# Patient Record
Sex: MALE | Race: WHITE | NOT HISPANIC OR LATINO | Employment: OTHER | URBAN - METROPOLITAN AREA
[De-identification: names, ages, dates, MRNs, and addresses within clinical notes are randomized per-mention and may not be internally consistent; named-entity substitution may affect disease eponyms.]

---

## 2023-08-16 ENCOUNTER — HOSPITAL ENCOUNTER (OUTPATIENT)
Dept: DATA CONVERSION | Facility: HOSPITAL | Age: 63
Discharge: HOME | End: 2023-08-16
Payer: COMMERCIAL

## 2023-08-16 DIAGNOSIS — E11.9 TYPE 2 DIABETES MELLITUS WITHOUT COMPLICATIONS (MULTI): ICD-10-CM

## 2023-08-16 DIAGNOSIS — Z00.00 ENCOUNTER FOR GENERAL ADULT MEDICAL EXAMINATION WITHOUT ABNORMAL FINDINGS: ICD-10-CM

## 2023-08-16 DIAGNOSIS — Z12.5 ENCOUNTER FOR SCREENING FOR MALIGNANT NEOPLASM OF PROSTATE: ICD-10-CM

## 2023-08-16 DIAGNOSIS — E78.00 PURE HYPERCHOLESTEROLEMIA, UNSPECIFIED: ICD-10-CM

## 2023-08-16 LAB
ALBUMIN SERPL-MCNC: 4.3 GM/DL (ref 3.5–5)
ALBUMIN/GLOB SERPL: 1.4 RATIO (ref 1.5–3)
ALP BLD-CCNC: 58 U/L (ref 35–125)
ALT SERPL-CCNC: 23 U/L (ref 5–40)
ANION GAP SERPL CALCULATED.3IONS-SCNC: 12 MMOL/L (ref 0–19)
APPEARANCE PLAS: ABNORMAL
AST SERPL-CCNC: 20 U/L (ref 5–40)
BACTERIA UR QL AUTO: NEGATIVE
BASOPHILS # BLD AUTO: 0.06 K/UL (ref 0–0.22)
BASOPHILS NFR BLD AUTO: 1.1 % (ref 0–1)
BILIRUB SERPL-MCNC: 0.6 MG/DL (ref 0.1–1.2)
BILIRUB UR QL STRIP.AUTO: NEGATIVE
BUN SERPL-MCNC: 13 MG/DL (ref 8–25)
BUN/CREAT SERPL: 14.4 RATIO (ref 8–21)
CALCIUM SERPL-MCNC: 9.1 MG/DL (ref 8.5–10.4)
CHLORIDE SERPL-SCNC: 105 MMOL/L (ref 97–107)
CHOLEST SERPL-MCNC: 129 MG/DL (ref 133–200)
CHOLEST/HDLC SERPL: 2.8 RATIO
CLARITY UR: CLEAR
CO2 SERPL-SCNC: 25 MMOL/L (ref 24–31)
COLOR SPUN FLD: ABNORMAL
COLOR UR: ABNORMAL
CREAT SERPL-MCNC: 0.9 MG/DL (ref 0.4–1.6)
CREAT UR-MCNC: 133.7 MG/DL
DEPRECATED RDW RBC AUTO: 46.4 FL (ref 37–54)
DIFFERENTIAL METHOD BLD: ABNORMAL
EOSINOPHIL # BLD AUTO: 0.19 K/UL (ref 0–0.45)
EOSINOPHIL NFR BLD: 3.4 % (ref 0–3)
ERYTHROCYTE [DISTWIDTH] IN BLOOD BY AUTOMATED COUNT: 12.9 % (ref 11.7–15)
FASTING STATUS PATIENT QL REPORTED: ABNORMAL
GFR SERPL CREATININE-BSD FRML MDRD: 95 ML/MIN/1.73 M2
GLOBULIN SER-MCNC: 3.1 G/DL (ref 1.9–3.7)
GLUCOSE SERPL-MCNC: 114 MG/DL (ref 65–99)
GLUCOSE UR STRIP.AUTO-MCNC: 100 MG/DL
HBA1C MFR BLD: 8.2 % (ref 4–6)
HCT VFR BLD AUTO: 39.5 % (ref 41–50)
HDLC SERPL-MCNC: 46 MG/DL
HGB BLD-MCNC: 13.2 GM/DL (ref 13.5–16.5)
HGB UR QL STRIP.AUTO: ABNORMAL /HPF (ref 0–3)
HGB UR QL: NEGATIVE
HYALINE CASTS UR QL AUTO: ABNORMAL /LPF
IMM GRANULOCYTES # BLD AUTO: 0.04 K/UL (ref 0–0.1)
KETONES UR QL STRIP.AUTO: NEGATIVE
LDLC SERPL CALC-MCNC: 62 MG/DL (ref 65–130)
LEUKOCYTE ESTERASE UR QL STRIP.AUTO: NEGATIVE
LYMPHOCYTES # BLD AUTO: 1.65 K/UL (ref 1.2–3.2)
LYMPHOCYTES NFR BLD MANUAL: 29.7 % (ref 20–40)
MCH RBC QN AUTO: 32.7 PG (ref 26–34)
MCHC RBC AUTO-ENTMCNC: 33.4 % (ref 31–37)
MCV RBC AUTO: 97.8 FL (ref 80–100)
MICROALBUMIN UR-MCNC: 12 MG/L (ref 0–23)
MICROALBUMIN/CREAT UR: 9 MG/G (ref 0–30)
MONOCYTES # BLD AUTO: 0.45 K/UL (ref 0–0.8)
MONOCYTES NFR BLD MANUAL: 8.1 % (ref 0–8)
NEUTROPHILS # BLD AUTO: 3.16 K/UL
NEUTROPHILS # BLD AUTO: 3.16 K/UL (ref 1.8–7.7)
NEUTROPHILS.IMMATURE NFR BLD: 0.7 % (ref 0–1)
NEUTS SEG NFR BLD: 57 % (ref 50–70)
NITRITE UR QL STRIP.AUTO: NEGATIVE
NRBC BLD-RTO: 0 /100 WBC
PH UR STRIP.AUTO: 5.5 [PH] (ref 4.6–8)
PLATELET # BLD AUTO: 213 K/UL (ref 150–450)
PMV BLD AUTO: 9.9 CU (ref 7–12.6)
POTASSIUM SERPL-SCNC: 4 MMOL/L (ref 3.4–5.1)
PROT SERPL-MCNC: 7.4 G/DL (ref 5.9–7.9)
PROT UR STRIP.AUTO-MCNC: ABNORMAL MG/DL
PSA SERPL-MCNC: 0.6 NG/ML (ref 0–4.1)
RBC # BLD AUTO: 4.04 M/UL (ref 4.5–5.5)
REFLEX MICROSCOPIC (UA): ABNORMAL
SODIUM SERPL-SCNC: 142 MMOL/L (ref 133–145)
SP GR UR STRIP.AUTO: 1.02 (ref 1–1.03)
SQUAMOUS UR QL AUTO: ABNORMAL /HPF
TRIGL SERPL-MCNC: 103 MG/DL (ref 40–150)
UROBILINOGEN UR QL STRIP.AUTO: NORMAL MG/DL (ref 0–1)
WBC # BLD AUTO: 5.6 K/UL (ref 4.5–11)
WBC #/AREA URNS AUTO: 1 /HPF (ref 0–3)

## 2023-10-23 DIAGNOSIS — E78.5 HYPERLIPIDEMIA, UNSPECIFIED HYPERLIPIDEMIA TYPE: Primary | ICD-10-CM

## 2023-11-01 RX ORDER — EZETIMIBE 10 MG/1
10 TABLET ORAL NIGHTLY
Qty: 90 TABLET | Refills: 3 | Status: SHIPPED | OUTPATIENT
Start: 2023-11-01 | End: 2023-11-06 | Stop reason: SDUPTHER

## 2023-11-04 DIAGNOSIS — E11.9 TYPE 2 DIABETES MELLITUS WITHOUT COMPLICATION, WITHOUT LONG-TERM CURRENT USE OF INSULIN (MULTI): Primary | ICD-10-CM

## 2023-11-06 DIAGNOSIS — E78.5 HYPERLIPIDEMIA, UNSPECIFIED HYPERLIPIDEMIA TYPE: Primary | ICD-10-CM

## 2023-11-06 RX ORDER — GLIPIZIDE 10 MG/1
10 TABLET ORAL DAILY
Qty: 90 TABLET | Refills: 0 | Status: SHIPPED | OUTPATIENT
Start: 2023-11-06 | End: 2023-11-30

## 2023-11-06 RX ORDER — EZETIMIBE 10 MG/1
10 TABLET ORAL NIGHTLY
Qty: 90 TABLET | Refills: 3 | Status: SHIPPED | OUTPATIENT
Start: 2023-11-06 | End: 2023-11-08 | Stop reason: SDUPTHER

## 2023-11-08 DIAGNOSIS — E78.5 HYPERLIPIDEMIA, UNSPECIFIED HYPERLIPIDEMIA TYPE: Primary | ICD-10-CM

## 2023-11-08 RX ORDER — EZETIMIBE 10 MG/1
10 TABLET ORAL NIGHTLY
Qty: 90 TABLET | Refills: 3 | Status: SHIPPED | OUTPATIENT
Start: 2023-11-08 | End: 2023-12-19 | Stop reason: SDUPTHER

## 2023-11-17 ENCOUNTER — TELEPHONE (OUTPATIENT)
Dept: PRIMARY CARE | Facility: CLINIC | Age: 63
End: 2023-11-17

## 2023-11-17 ENCOUNTER — LAB (OUTPATIENT)
Dept: LAB | Facility: LAB | Age: 63
End: 2023-11-17
Payer: MEDICARE

## 2023-11-17 DIAGNOSIS — E11.9 TYPE 2 DIABETES MELLITUS WITHOUT COMPLICATION, UNSPECIFIED WHETHER LONG TERM INSULIN USE (MULTI): ICD-10-CM

## 2023-11-17 LAB
ALBUMIN SERPL-MCNC: 4.3 G/DL (ref 3.5–5)
ALP BLD-CCNC: 50 U/L (ref 35–125)
ALT SERPL-CCNC: 17 U/L (ref 5–40)
ANION GAP SERPL CALC-SCNC: 10 MMOL/L
AST SERPL-CCNC: 15 U/L (ref 5–40)
BILIRUB SERPL-MCNC: 0.7 MG/DL (ref 0.1–1.2)
BUN SERPL-MCNC: 18 MG/DL (ref 8–25)
CALCIUM SERPL-MCNC: 9 MG/DL (ref 8.5–10.4)
CHLORIDE SERPL-SCNC: 103 MMOL/L (ref 97–107)
CO2 SERPL-SCNC: 26 MMOL/L (ref 24–31)
CREAT SERPL-MCNC: 0.9 MG/DL (ref 0.4–1.6)
EST. AVERAGE GLUCOSE BLD GHB EST-MCNC: 186 MG/DL
GFR SERPL CREATININE-BSD FRML MDRD: >90 ML/MIN/1.73M*2
GLUCOSE SERPL-MCNC: 172 MG/DL (ref 65–99)
HBA1C MFR BLD: 8.1 %
POTASSIUM SERPL-SCNC: 4.5 MMOL/L (ref 3.4–5.1)
PROT SERPL-MCNC: 7 G/DL (ref 5.9–7.9)
SODIUM SERPL-SCNC: 139 MMOL/L (ref 133–145)

## 2023-11-17 PROCEDURE — 36415 COLL VENOUS BLD VENIPUNCTURE: CPT

## 2023-11-17 PROCEDURE — 83036 HEMOGLOBIN GLYCOSYLATED A1C: CPT

## 2023-11-17 PROCEDURE — 80053 COMPREHEN METABOLIC PANEL: CPT

## 2023-11-21 ENCOUNTER — OFFICE VISIT (OUTPATIENT)
Dept: PRIMARY CARE | Facility: CLINIC | Age: 63
End: 2023-11-21
Payer: MEDICARE

## 2023-11-21 ENCOUNTER — TELEPHONE (OUTPATIENT)
Dept: PRIMARY CARE | Facility: CLINIC | Age: 63
End: 2023-11-21

## 2023-11-21 VITALS
WEIGHT: 203 LBS | DIASTOLIC BLOOD PRESSURE: 70 MMHG | HEART RATE: 83 BPM | SYSTOLIC BLOOD PRESSURE: 122 MMHG | OXYGEN SATURATION: 96 % | BODY MASS INDEX: 30.87 KG/M2

## 2023-11-21 DIAGNOSIS — E11.9 TYPE 2 DIABETES MELLITUS WITHOUT COMPLICATION, UNSPECIFIED WHETHER LONG TERM INSULIN USE (MULTI): ICD-10-CM

## 2023-11-21 DIAGNOSIS — E78.5 HYPERLIPIDEMIA, UNSPECIFIED HYPERLIPIDEMIA TYPE: ICD-10-CM

## 2023-11-21 DIAGNOSIS — E11.9 TYPE 2 DIABETES MELLITUS WITHOUT COMPLICATION, WITHOUT LONG-TERM CURRENT USE OF INSULIN (MULTI): Primary | ICD-10-CM

## 2023-11-21 PROBLEM — M75.81 TENDINITIS OF RIGHT ROTATOR CUFF: Status: ACTIVE | Noted: 2023-11-21

## 2023-11-21 PROBLEM — L08.89 OTHER SPECIFIED LOCAL INFECTIONS OF THE SKIN AND SUBCUTANEOUS TISSUE: Status: ACTIVE | Noted: 2021-02-17

## 2023-11-21 PROBLEM — M54.12 CERVICAL RADICULOPATHY: Status: ACTIVE | Noted: 2023-11-21

## 2023-11-21 PROBLEM — M54.50 ACUTE LOW BACK PAIN: Status: ACTIVE | Noted: 2023-11-21

## 2023-11-21 PROBLEM — R07.9 CHEST PAIN: Status: ACTIVE | Noted: 2019-02-19

## 2023-11-21 PROBLEM — M54.9 BACKACHE: Status: ACTIVE | Noted: 2023-11-21

## 2023-11-21 PROBLEM — I20.9 ANGINA PECTORIS (CMS-HCC): Status: ACTIVE | Noted: 2023-11-21

## 2023-11-21 PROBLEM — R09.89 CAROTID BRUIT: Status: ACTIVE | Noted: 2023-11-21

## 2023-11-21 PROBLEM — E78.00 HYPERCHOLESTEROLEMIA: Status: ACTIVE | Noted: 2023-11-21

## 2023-11-21 PROBLEM — G95.9 CERVICAL MYELOPATHY WITH CERVICAL RADICULOPATHY (MULTI): Status: ACTIVE | Noted: 2023-11-21

## 2023-11-21 PROBLEM — R09.81 NASAL CONGESTION: Status: ACTIVE | Noted: 2021-12-03

## 2023-11-21 PROBLEM — R07.81 RIB PAIN: Status: ACTIVE | Noted: 2023-11-21

## 2023-11-21 PROBLEM — E66.3 OVERWEIGHT: Status: ACTIVE | Noted: 2019-06-13

## 2023-11-21 PROBLEM — M79.606 PAIN OF LOWER EXTREMITY: Status: ACTIVE | Noted: 2023-11-21

## 2023-11-21 PROBLEM — R10.9 RIGHT FLANK PAIN: Status: ACTIVE | Noted: 2022-12-07

## 2023-11-21 PROBLEM — M25.561 ACUTE PAIN OF RIGHT KNEE: Status: ACTIVE | Noted: 2022-02-04

## 2023-11-21 PROBLEM — K40.90 RIGHT INGUINAL HERNIA: Status: ACTIVE | Noted: 2023-11-21

## 2023-11-21 PROBLEM — Z96.652 S/P TOTAL KNEE ARTHROPLASTY, LEFT: Status: ACTIVE | Noted: 2021-08-24

## 2023-11-21 PROBLEM — M25.461 EFFUSION OF RIGHT KNEE: Status: ACTIVE | Noted: 2023-11-21

## 2023-11-21 PROBLEM — M54.16 CHRONIC LUMBAR RADICULOPATHY: Status: ACTIVE | Noted: 2023-11-21

## 2023-11-21 PROBLEM — R06.09 DYSPNEA ON EXERTION: Status: ACTIVE | Noted: 2023-11-21

## 2023-11-21 PROBLEM — J11.1 INFLUENZA: Status: ACTIVE | Noted: 2023-11-21

## 2023-11-21 PROBLEM — R19.8 RLQ FULLNESS: Status: ACTIVE | Noted: 2022-12-07

## 2023-11-21 PROBLEM — H61.23 IMPACTED CERUMEN, BILATERAL: Status: ACTIVE | Noted: 2021-12-03

## 2023-11-21 PROBLEM — R26.9 GAIT DISTURBANCE: Status: ACTIVE | Noted: 2023-11-21

## 2023-11-21 PROBLEM — L98.9 SKIN LESIONS: Status: ACTIVE | Noted: 2022-03-04

## 2023-11-21 PROBLEM — E78.00 HYPERCHOLESTEREMIA: Status: ACTIVE | Noted: 2023-11-21

## 2023-11-21 PROBLEM — R05.9 COUGH: Status: ACTIVE | Noted: 2019-05-14

## 2023-11-21 PROBLEM — M62.81 MUSCLE WEAKNESS (GENERALIZED): Status: ACTIVE | Noted: 2023-11-21

## 2023-11-21 PROBLEM — M25.519 SHOULDER PAIN: Status: ACTIVE | Noted: 2023-11-21

## 2023-11-21 PROBLEM — M48.02 CERVICAL SPINAL STENOSIS: Status: ACTIVE | Noted: 2023-11-21

## 2023-11-21 PROBLEM — R10.13 EPIGASTRIC PAIN: Status: ACTIVE | Noted: 2019-06-13

## 2023-11-21 PROBLEM — L01.00 IMPETIGO: Status: ACTIVE | Noted: 2018-10-15

## 2023-11-21 PROBLEM — M54.50 LOW BACK PAIN: Status: ACTIVE | Noted: 2023-11-21

## 2023-11-21 PROBLEM — F41.9 ANXIETY: Status: ACTIVE | Noted: 2023-11-21

## 2023-11-21 PROBLEM — M48.02 FORAMINAL STENOSIS OF CERVICAL REGION: Status: ACTIVE | Noted: 2023-11-21

## 2023-11-21 PROBLEM — M54.12 CERVICAL MYELOPATHY WITH CERVICAL RADICULOPATHY (MULTI): Status: ACTIVE | Noted: 2023-11-21

## 2023-11-21 PROBLEM — M54.2 NECK PAIN: Status: ACTIVE | Noted: 2022-03-29

## 2023-11-21 PROBLEM — R19.4 CHANGE IN BOWEL HABITS: Status: ACTIVE | Noted: 2022-12-21

## 2023-11-21 PROBLEM — R10.31 CONTINUOUS RLQ ABDOMINAL PAIN: Status: ACTIVE | Noted: 2022-12-21

## 2023-11-21 PROBLEM — R07.89 CHEST DISCOMFORT: Status: ACTIVE | Noted: 2023-11-21

## 2023-11-21 PROBLEM — M17.11 PRIMARY OSTEOARTHRITIS OF RIGHT KNEE: Status: ACTIVE | Noted: 2021-08-24

## 2023-11-21 PROBLEM — I25.10 CORONARY ARTERY DISEASE: Status: ACTIVE | Noted: 2020-06-16

## 2023-11-21 PROBLEM — M51.06 INTERVERTEBRAL DISC DISORDER WITH MYELOPATHY, LUMBAR REGION: Status: ACTIVE | Noted: 2018-10-01

## 2023-11-21 PROBLEM — R06.02 SOB (SHORTNESS OF BREATH): Status: ACTIVE | Noted: 2019-05-19

## 2023-11-21 PROBLEM — R11.2 NAUSEA & VOMITING: Status: ACTIVE | Noted: 2023-11-21

## 2023-11-21 PROBLEM — R97.20 HIGH PROSTATE SPECIFIC ANTIGEN (PSA): Status: ACTIVE | Noted: 2022-08-12

## 2023-11-21 PROCEDURE — 3078F DIAST BP <80 MM HG: CPT | Performed by: STUDENT IN AN ORGANIZED HEALTH CARE EDUCATION/TRAINING PROGRAM

## 2023-11-21 PROCEDURE — 99214 OFFICE O/P EST MOD 30 MIN: CPT | Performed by: STUDENT IN AN ORGANIZED HEALTH CARE EDUCATION/TRAINING PROGRAM

## 2023-11-21 PROCEDURE — 1036F TOBACCO NON-USER: CPT | Performed by: STUDENT IN AN ORGANIZED HEALTH CARE EDUCATION/TRAINING PROGRAM

## 2023-11-21 PROCEDURE — 3074F SYST BP LT 130 MM HG: CPT | Performed by: STUDENT IN AN ORGANIZED HEALTH CARE EDUCATION/TRAINING PROGRAM

## 2023-11-21 PROCEDURE — 3052F HG A1C>EQUAL 8.0%<EQUAL 9.0%: CPT | Performed by: STUDENT IN AN ORGANIZED HEALTH CARE EDUCATION/TRAINING PROGRAM

## 2023-11-21 RX ORDER — PIOGLITAZONEHYDROCHLORIDE 30 MG/1
30 TABLET ORAL DAILY
COMMUNITY
Start: 2023-08-21 | End: 2024-02-16 | Stop reason: SINTOL

## 2023-11-21 RX ORDER — GABAPENTIN 100 MG/1
100 CAPSULE ORAL
COMMUNITY

## 2023-11-21 RX ORDER — METHOCARBAMOL 500 MG/1
500 TABLET, FILM COATED ORAL 3 TIMES DAILY
COMMUNITY
End: 2024-01-17

## 2023-11-21 RX ORDER — CYCLOBENZAPRINE HCL 5 MG
TABLET ORAL
COMMUNITY
Start: 2018-05-22 | End: 2023-12-04 | Stop reason: ALTCHOICE

## 2023-11-21 RX ORDER — GLIMEPIRIDE 2 MG/1
2 TABLET ORAL
COMMUNITY
Start: 2022-08-12 | End: 2023-12-04 | Stop reason: ALTCHOICE

## 2023-11-21 RX ORDER — ISOSORBIDE DINITRATE 30 MG/1
TABLET ORAL EVERY 24 HOURS
COMMUNITY
Start: 2012-01-12 | End: 2023-12-04 | Stop reason: ALTCHOICE

## 2023-11-21 RX ORDER — PRAVASTATIN SODIUM 40 MG/1
40 TABLET ORAL
COMMUNITY
End: 2023-12-04 | Stop reason: ALTCHOICE

## 2023-11-21 RX ORDER — ROSUVASTATIN CALCIUM 40 MG/1
40 TABLET, COATED ORAL DAILY
COMMUNITY
Start: 2023-03-01 | End: 2023-12-04 | Stop reason: ALTCHOICE

## 2023-11-21 RX ORDER — SERTRALINE HYDROCHLORIDE 50 MG/1
1 TABLET, FILM COATED ORAL NIGHTLY
COMMUNITY
Start: 2018-01-25 | End: 2023-11-27 | Stop reason: WASHOUT

## 2023-11-21 RX ORDER — NITROGLYCERIN 0.4 MG/1
0.4 TABLET SUBLINGUAL EVERY 5 MIN PRN
COMMUNITY
Start: 2023-10-30

## 2023-11-21 RX ORDER — BACLOFEN 10 MG/1
TABLET ORAL
COMMUNITY

## 2023-11-21 RX ORDER — ACETAMINOPHEN 500 MG
1000 TABLET ORAL EVERY 8 HOURS PRN
COMMUNITY
Start: 2022-01-31 | End: 2023-12-04 | Stop reason: SDDI

## 2023-11-21 RX ORDER — ONDANSETRON 4 MG/1
TABLET, ORALLY DISINTEGRATING ORAL
COMMUNITY
Start: 2018-02-22 | End: 2023-11-27 | Stop reason: WASHOUT

## 2023-11-21 RX ORDER — METOPROLOL SUCCINATE 25 MG/1
TABLET, EXTENDED RELEASE ORAL
COMMUNITY
Start: 2023-11-11 | End: 2023-12-19 | Stop reason: SDUPTHER

## 2023-11-21 RX ORDER — CEFUROXIME AXETIL 250 MG/1
TABLET ORAL
COMMUNITY
Start: 2018-02-27 | End: 2023-12-04 | Stop reason: ALTCHOICE

## 2023-11-21 RX ORDER — ASPIRIN 81 MG/1
81 TABLET ORAL 2 TIMES DAILY
COMMUNITY
Start: 2022-02-01

## 2023-11-21 RX ORDER — GABAPENTIN 400 MG/1
400 CAPSULE ORAL NIGHTLY
COMMUNITY

## 2023-11-21 RX ORDER — METFORMIN HYDROCHLORIDE 500 MG/1
TABLET ORAL EVERY 24 HOURS
COMMUNITY
End: 2023-12-04 | Stop reason: ALTCHOICE

## 2023-11-21 ASSESSMENT — PATIENT HEALTH QUESTIONNAIRE - PHQ9
SUM OF ALL RESPONSES TO PHQ9 QUESTIONS 1 AND 2: 0
1. LITTLE INTEREST OR PLEASURE IN DOING THINGS: NOT AT ALL
2. FEELING DOWN, DEPRESSED OR HOPELESS: NOT AT ALL

## 2023-11-21 ASSESSMENT — PAIN SCALES - GENERAL: PAINLEVEL: 0-NO PAIN

## 2023-11-21 NOTE — PROGRESS NOTES
"Subjective   Martir Johnson is a 63 y.o. male who presents for Diabetes Follow up    #DM  A1c - 8.1, 8.2, 7.5, 7.5, 7.1, 7.8, 7.1,  7.4, 7.3, 7.5, 7.4, 7.0, 7.3  Current treatment:  - Metformin 1000 mg BID ER--- prescribed to be taking 2 500mg tablets twice a day but says he is taking 1 tablet  twice daily, says he thinks that is what the bottle says.  - Pioglitazone 15mg BID --- we discussed changing this to 30mg once daily - he is not sure if taking this way  - Glipizide 10mg  Not checking home sugars  No symptoms of hypoglycemia  Refuses CGM   Last eGFR - >90  Last Creatinine - 0.9  Last microalbumin - 6/22  Last foot exam - sees podiatry, Dr. Schwarz  Pneumovax done? - 2013  Need for diabetic educator? - No  Last eye exam - 10/2023 Dr Fatima    Follows with cardiology for CAD, Dr. Dunlap    LAB REVIEW   Glucose   Date Value   11/17/2023 172 mg/dL (H)   08/16/2023 114 MG/DL (H)   04/18/2023 165 MG/DL (H)   12/19/2022 148 MG/DL (H)     Hemoglobin A1C (%)   Date Value   11/17/2023 8.1 (H)   08/16/2023 8.2 (H)   04/18/2023 7.5 (H)   12/19/2022 7.5 (H)   01/26/2022 7.2 (H)     Creatinine   Date Value   11/17/2023 0.90 mg/dL   08/16/2023 0.9 MG/DL   04/18/2023 0.9 MG/DL   12/19/2022 0.9 MG/DL     Lab Results   Component Value Date    ALBUR 12 08/16/2023    LDLCALC 62 (L) 08/16/2023    CREATININE 0.90 11/17/2023     Lab Results   Component Value Date    CHOL 129 (L) 08/16/2023    CHOL 118 (L) 04/18/2023    CHOL 221 (H) 03/16/2023     Lab Results   Component Value Date    HDL 46 08/16/2023    HDL 47 04/18/2023    HDL 41 03/16/2023     Lab Results   Component Value Date    LDLCALC 62 (L) 08/16/2023    LDLCALC 54 (L) 04/18/2023    LDLCALC 147 (H) 03/16/2023     Lab Results   Component Value Date    TRIG 103 08/16/2023    TRIG 84 04/18/2023    TRIG 165 (H) 03/16/2023     No components found for: \"CHOLHDL\"  Lab Results   Component Value Date    LDLCALC 62 (L) 08/16/2023       ROS otherwise negative aside from what was " mentioned above in HPI.      Objective   /70   Pulse 83   Wt 92.1 kg (203 lb)   SpO2 96%   BMI 30.87 kg/m²     Physical Exam  Constitutional:       Appearance: Normal appearance.   Cardiovascular:      Rate and Rhythm: Normal rate and regular rhythm.      Heart sounds: Normal heart sounds. No murmur heard.  Pulmonary:      Effort: Pulmonary effort is normal.      Breath sounds: Normal breath sounds. No wheezing, rhonchi or rales.   Musculoskeletal:      Right lower leg: No edema.      Left lower leg: No edema.   Neurological:      Mental Status: He is alert.         Assessment/Plan   1. Type 2 diabetes mellitus without complication, without long-term current use of insulin (CMS/[facility])  Needs better control. He is not clear on how he is currently taking his medications at home so difficult to make medication changes at this time. Will have him schedule with pharmacist for medication reconciliation and DM medication management. He does not check blood sugars at home and is not willing to start. Discussed CGM with him and do recommend he consider so that we can get his diabetes under better control. He said he is willing to discuss further with pharmacist and is potentially open to trying this. Will follow with pharmacist in next few weeks and schedule with me in 3 months.

## 2023-11-24 RX ORDER — ATORVASTATIN CALCIUM 20 MG/1
20 TABLET, FILM COATED ORAL DAILY
Qty: 90 TABLET | Refills: 3 | Status: SHIPPED | OUTPATIENT
Start: 2023-11-24 | End: 2023-12-19 | Stop reason: SDUPTHER

## 2023-11-29 DIAGNOSIS — E11.9 TYPE 2 DIABETES MELLITUS WITHOUT COMPLICATION, WITHOUT LONG-TERM CURRENT USE OF INSULIN (MULTI): ICD-10-CM

## 2023-11-30 RX ORDER — GLIPIZIDE 10 MG/1
10 TABLET ORAL DAILY
Qty: 90 TABLET | Refills: 0 | Status: SHIPPED | OUTPATIENT
Start: 2023-11-30 | End: 2024-06-10

## 2023-12-04 ENCOUNTER — CLINICAL SUPPORT (OUTPATIENT)
Dept: PRIMARY CARE | Facility: CLINIC | Age: 63
End: 2023-12-04

## 2023-12-04 DIAGNOSIS — E11.65 TYPE 2 DIABETES MELLITUS WITH HYPERGLYCEMIA, WITHOUT LONG-TERM CURRENT USE OF INSULIN (MULTI): Primary | ICD-10-CM

## 2023-12-04 RX ORDER — FLASH GLUCOSE SENSOR
KIT MISCELLANEOUS
Qty: 2 EACH | Refills: 5 | Status: SHIPPED | OUTPATIENT
Start: 2023-12-04

## 2023-12-04 RX ORDER — FLASH GLUCOSE SCANNING READER
EACH MISCELLANEOUS
Qty: 1 EACH | Refills: 0 | Status: SHIPPED | OUTPATIENT
Start: 2023-12-04

## 2023-12-04 RX ORDER — DAPAGLIFLOZIN 10 MG/1
10 TABLET, FILM COATED ORAL DAILY
Qty: 30 TABLET | Refills: 5 | Status: SHIPPED | OUTPATIENT
Start: 2023-12-04 | End: 2024-02-09 | Stop reason: SDUPTHER

## 2023-12-04 NOTE — PATIENT INSTRUCTIONS
STOP Metformin   START Farxiga 10mg once daily in morning  Call for appt with clinical pharmacist once Heath supplies are received from pharmacy

## 2023-12-04 NOTE — PROGRESS NOTES
"Medication Review    Mr. Johnson presents to the office today for medication reconciliation and management of diabetes.  Pt brings all medications with him to appt for review.      Medication list reviewed and updated.    Pt reports frequent diarrhea/stomach pain  Pt reports recent polydipsia, polyuria  Previously used insulin for diabetes control but states that his skin is \"too thick\" and he cannot give himself injections.      Referring Provider: Zara Castellanos MD    Hemoglobin A1C (%)   Date Value   11/17/2023 8.1 (H)   08/16/2023 8.2 (H)   04/18/2023 7.5 (H)   12/19/2022 7.5 (H)   01/26/2022 7.2 (H)     Glucose   Date Value   11/17/2023 172 mg/dL (H)   08/16/2023 114 MG/DL (H)   04/18/2023 165 MG/DL (H)   12/19/2022 148 MG/DL (H)     Creatinine   Date Value   11/17/2023 0.90 mg/dL   08/16/2023 0.9 MG/DL   04/18/2023 0.9 MG/DL   12/19/2022 0.9 MG/DL     eGFR (mL/min/1.73m*2)   Date Value   11/17/2023 >90     ESTIMATED GFR (mL/min/1.73 m2)   Date Value   08/16/2023 95   04/18/2023 95   12/19/2022 96       CURRENT PHARMACOTHERAPY  Medication Reconciliation completed with patient in office today   Current Outpatient Medications on File Prior to Visit   Medication Sig Dispense Refill    acetaminophen (Tylenol) 500 mg tablet Take 2 tablets (1,000 mg) by mouth every 8 hours if needed.      aspirin 81 mg EC tablet Take 1 tablet (81 mg) by mouth twice a day.      atorvastatin (Lipitor) 20 mg tablet Take 1 tablet (20 mg) by mouth once daily. 90 tablet 3    baclofen (Lioresal) 10 mg tablet TAKE ONE-HALF TO 1 TABLET BY MOUTH EVERY 8 HOURS AS NEEDED AS INSTRUCTED      cefuroxime (Ceftin) 250 mg tablet Take by mouth.      cyclobenzaprine (Flexeril) 5 mg tablet Take by mouth.      ezetimibe (Zetia) 10 mg tablet Take 1 tablet (10 mg) by mouth once daily at bedtime. 90 tablet 3    gabapentin (Neurontin) 100 mg capsule Take 1 capsule (100 mg) by mouth once daily in the morning. Take before meals.      gabapentin (Neurontin) 400 mg " capsule Take 1 capsule (400 mg) by mouth once daily at bedtime.      glimepiride (Amaryl) 2 mg tablet Take 1 tablet (2 mg) by mouth.      glipiZIDE (Glucotrol) 10 mg tablet TAKE ONE TABLET BY MOUTH EVERY DAY 90 tablet 0    isosorbide dinitrate (Isordil) 30 mg tablet once every 24 hours.      metFORMIN (Glucophage) 500 mg tablet once every 24 hours.      methocarbamol (Robaxin) 500 mg tablet Take 1 tablet (500 mg) by mouth 3 times a day.      metoprolol succinate XL (Toprol-XL) 25 mg 24 hr tablet TAKE ONE TABLET IN THE EVENING (IN ADDITION TO THE 50 MG IN THE MORNING).      nitroglycerin (Nitrostat) 0.4 mg SL tablet Place 1 tablet (0.4 mg) under the tongue every 5 minutes if needed for chest pain. place 1 tablet under tongue every 5 minutes for up to 3 doses as needed for chest pain. Call 911 if pain persists      pioglitazone (Actos) 30 mg tablet Take 1 tablet (30 mg) by mouth once daily.      pravastatin (Pravachol) 40 mg tablet Take 1 tablet (40 mg) by mouth once daily.      rosuvastatin (Crestor) 40 mg tablet Take 1 tablet (40 mg) by mouth once daily.      [DISCONTINUED] glipiZIDE (Glucotrol) 10 mg tablet TAKE ONE TABLET BY MOUTH ONCE A DAY 90 tablet 0     No current facility-administered medications on file prior to visit.       Allergies   Allergen Reactions    Codeine Hives    Iodine Rash    Hydrocodone-Homatropine Hives    Nsaids (Non-Steroidal Anti-Inflammatory Drug) Unknown     gi upset         SMBG:  Testing at home? No  Recent FBS unknown     CGM:  Pt does not have smartphone, will order [employer] 2 reader/sensors.  Pt will make appt for CGM start once supplies are received    Hypoglycemia tx/sx/prevention:   denies issues with low glucose  Reviewed sx and treatment  Advised to carry glucose source at all times    Assessment/Plan:  Suggest discontinuing Metformin due to SE/diarrhea and stomach pain   Will benefit from addition of Farxiga.    Farxiga Education:  - Counseled patient on Farxiga MOA,  expectations, side effects, duration of therapy, administration, and monitoring parameters.  - Reviewed the benefits of SGLT-2i therapy, such as glycemic control and kidney and CV protection.  - Advised patient to practice proper  hygiene to reduce risk of UTIs or yeast infections.  - Advised patient to maintain adequate fluid intake to remain hydrated while on SGLT2i therapy.  - Answered all patient questions and concerns.      Plan:  STOP Metformin   START Farxiga 10mg once daily in morning  Call for appt with clinical pharmacist once Heath supplies are received from pharmacy       Treatment and plan discussed with  JOEL Castellanos MD, COLETTE Kevin, [facility]

## 2023-12-19 ENCOUNTER — OFFICE VISIT (OUTPATIENT)
Dept: CARDIOLOGY | Facility: HOSPITAL | Age: 63
End: 2023-12-19
Payer: MEDICARE

## 2023-12-19 VITALS
HEART RATE: 72 BPM | DIASTOLIC BLOOD PRESSURE: 70 MMHG | WEIGHT: 200 LBS | HEIGHT: 68 IN | SYSTOLIC BLOOD PRESSURE: 115 MMHG | BODY MASS INDEX: 30.31 KG/M2

## 2023-12-19 DIAGNOSIS — E78.5 HYPERLIPIDEMIA, UNSPECIFIED HYPERLIPIDEMIA TYPE: ICD-10-CM

## 2023-12-19 DIAGNOSIS — I25.10 CORONARY ARTERY DISEASE, UNSPECIFIED VESSEL OR LESION TYPE, UNSPECIFIED WHETHER ANGINA PRESENT, UNSPECIFIED WHETHER NATIVE OR TRANSPLANTED HEART: Primary | ICD-10-CM

## 2023-12-19 PROCEDURE — 99214 OFFICE O/P EST MOD 30 MIN: CPT | Performed by: INTERNAL MEDICINE

## 2023-12-19 PROCEDURE — 1036F TOBACCO NON-USER: CPT | Performed by: INTERNAL MEDICINE

## 2023-12-19 PROCEDURE — 3074F SYST BP LT 130 MM HG: CPT | Performed by: INTERNAL MEDICINE

## 2023-12-19 PROCEDURE — 3078F DIAST BP <80 MM HG: CPT | Performed by: INTERNAL MEDICINE

## 2023-12-19 PROCEDURE — 3052F HG A1C>EQUAL 8.0%<EQUAL 9.0%: CPT | Performed by: INTERNAL MEDICINE

## 2023-12-19 RX ORDER — METOPROLOL SUCCINATE 25 MG/1
25 TABLET, EXTENDED RELEASE ORAL 2 TIMES DAILY
Qty: 180 TABLET | Refills: 3 | Status: SHIPPED | OUTPATIENT
Start: 2023-12-19 | End: 2024-12-18

## 2023-12-19 RX ORDER — ATORVASTATIN CALCIUM 20 MG/1
20 TABLET, FILM COATED ORAL DAILY
Qty: 90 TABLET | Refills: 3 | Status: SHIPPED | OUTPATIENT
Start: 2023-12-19 | End: 2024-12-18

## 2023-12-19 RX ORDER — EZETIMIBE 10 MG/1
10 TABLET ORAL NIGHTLY
Qty: 90 TABLET | Refills: 3 | Status: SHIPPED | OUTPATIENT
Start: 2023-12-19 | End: 2024-02-09 | Stop reason: SDUPTHER

## 2023-12-19 ASSESSMENT — ENCOUNTER SYMPTOMS
DEPRESSION: 0
LOSS OF SENSATION IN FEET: 0
OCCASIONAL FEELINGS OF UNSTEADINESS: 0

## 2023-12-19 NOTE — PROGRESS NOTES
Subjective:  Patient returns for a routine 4-month follow-up.  I was pleased to see that he is generally doing better with escalated metoprolol dosing.  He does have extensive coronary calcification and coronary disease, but he did not have any easily revascularizable lesions.  He is getting around better although is still limited by his back problems and arthritis.  He is certainly taking less nitroglycerin and is happy with this.  He denies any prolonged or severe episodes of pain.  He has not had any hospitalizations and denies any other new health concerns.    Objective:  General: Alert, usual pleasant self.  HEENT: Unchanged.  Lungs: Clear without crackles or wheezing.  Cardiac: Distant heart tones with soft systolic murmur.  Abdomen: Nontender.  Extremities: No edema.  Skin: No rash.  Neuro: Grossly unchanged.    Lipid panel: Cholesterol-129, HDL-46, LDL-62, TG-103.    Impression/plan:  Martir is doing better at this time with escalated medical therapy for his known significant coronary disease.  He is not having any unstable symptomatology.  His heart rate and blood pressure remain under very good control.  He remains on appropriate antiplatelet therapy with aspirin, and his lipid panel looks excellent on combination therapy with atorvastatin and Zetia.  Given his improved clinical status, I will plan on seeing him back briefly in 6 months.  I did not think we needed to embark on any additional cardiovascular testing at this time and did not think we needed to make any additional medication changes.    Patient instructions:    Continue current medications unchanged.    Return to clinic in 6 months.

## 2024-02-08 ENCOUNTER — LAB (OUTPATIENT)
Dept: LAB | Facility: LAB | Age: 64
End: 2024-02-08
Payer: MEDICARE

## 2024-02-08 DIAGNOSIS — E11.9 TYPE 2 DIABETES MELLITUS WITHOUT COMPLICATION, UNSPECIFIED WHETHER LONG TERM INSULIN USE (MULTI): ICD-10-CM

## 2024-02-08 LAB
ALBUMIN SERPL-MCNC: 4.5 G/DL (ref 3.5–5)
ALP BLD-CCNC: 61 U/L (ref 35–125)
ALT SERPL-CCNC: 19 U/L (ref 5–40)
ANION GAP SERPL CALC-SCNC: 11 MMOL/L
AST SERPL-CCNC: 15 U/L (ref 5–40)
BILIRUB SERPL-MCNC: 0.6 MG/DL (ref 0.1–1.2)
BUN SERPL-MCNC: 12 MG/DL (ref 8–25)
CALCIUM SERPL-MCNC: 9.1 MG/DL (ref 8.5–10.4)
CHLORIDE SERPL-SCNC: 102 MMOL/L (ref 97–107)
CO2 SERPL-SCNC: 27 MMOL/L (ref 24–31)
CREAT SERPL-MCNC: 0.9 MG/DL (ref 0.4–1.6)
EGFRCR SERPLBLD CKD-EPI 2021: >90 ML/MIN/1.73M*2
EST. AVERAGE GLUCOSE BLD GHB EST-MCNC: 209 MG/DL
GLUCOSE SERPL-MCNC: 197 MG/DL (ref 65–99)
HBA1C MFR BLD: 8.9 %
POTASSIUM SERPL-SCNC: 4.5 MMOL/L (ref 3.4–5.1)
PROT SERPL-MCNC: 7.3 G/DL (ref 5.9–7.9)
SODIUM SERPL-SCNC: 140 MMOL/L (ref 133–145)

## 2024-02-08 PROCEDURE — 80053 COMPREHEN METABOLIC PANEL: CPT

## 2024-02-08 PROCEDURE — 83036 HEMOGLOBIN GLYCOSYLATED A1C: CPT

## 2024-02-08 PROCEDURE — 36415 COLL VENOUS BLD VENIPUNCTURE: CPT

## 2024-02-09 ENCOUNTER — TELEPHONE (OUTPATIENT)
Dept: PRIMARY CARE | Facility: CLINIC | Age: 64
End: 2024-02-09

## 2024-02-09 ENCOUNTER — OFFICE VISIT (OUTPATIENT)
Dept: PRIMARY CARE | Facility: CLINIC | Age: 64
End: 2024-02-09
Payer: MEDICARE

## 2024-02-09 VITALS
DIASTOLIC BLOOD PRESSURE: 72 MMHG | SYSTOLIC BLOOD PRESSURE: 116 MMHG | OXYGEN SATURATION: 98 % | HEART RATE: 85 BPM | BODY MASS INDEX: 29.53 KG/M2 | WEIGHT: 194.2 LBS

## 2024-02-09 DIAGNOSIS — E11.9 TYPE 2 DIABETES MELLITUS WITHOUT COMPLICATION, WITHOUT LONG-TERM CURRENT USE OF INSULIN (MULTI): ICD-10-CM

## 2024-02-09 DIAGNOSIS — E78.5 HYPERLIPIDEMIA, UNSPECIFIED HYPERLIPIDEMIA TYPE: ICD-10-CM

## 2024-02-09 DIAGNOSIS — Z23 IMMUNIZATION DUE: ICD-10-CM

## 2024-02-09 DIAGNOSIS — E11.65 TYPE 2 DIABETES MELLITUS WITH HYPERGLYCEMIA, WITHOUT LONG-TERM CURRENT USE OF INSULIN (MULTI): ICD-10-CM

## 2024-02-09 PROCEDURE — 99214 OFFICE O/P EST MOD 30 MIN: CPT | Performed by: STUDENT IN AN ORGANIZED HEALTH CARE EDUCATION/TRAINING PROGRAM

## 2024-02-09 PROCEDURE — 3078F DIAST BP <80 MM HG: CPT | Performed by: STUDENT IN AN ORGANIZED HEALTH CARE EDUCATION/TRAINING PROGRAM

## 2024-02-09 PROCEDURE — 3052F HG A1C>EQUAL 8.0%<EQUAL 9.0%: CPT | Performed by: STUDENT IN AN ORGANIZED HEALTH CARE EDUCATION/TRAINING PROGRAM

## 2024-02-09 PROCEDURE — G0008 ADMIN INFLUENZA VIRUS VAC: HCPCS | Performed by: STUDENT IN AN ORGANIZED HEALTH CARE EDUCATION/TRAINING PROGRAM

## 2024-02-09 PROCEDURE — 1036F TOBACCO NON-USER: CPT | Performed by: STUDENT IN AN ORGANIZED HEALTH CARE EDUCATION/TRAINING PROGRAM

## 2024-02-09 PROCEDURE — 90686 IIV4 VACC NO PRSV 0.5 ML IM: CPT | Performed by: STUDENT IN AN ORGANIZED HEALTH CARE EDUCATION/TRAINING PROGRAM

## 2024-02-09 PROCEDURE — 3074F SYST BP LT 130 MM HG: CPT | Performed by: STUDENT IN AN ORGANIZED HEALTH CARE EDUCATION/TRAINING PROGRAM

## 2024-02-09 RX ORDER — GLIPIZIDE 10 MG/1
10 TABLET ORAL DAILY
Qty: 90 TABLET | Refills: 0 | Status: CANCELLED | OUTPATIENT
Start: 2024-02-09

## 2024-02-09 NOTE — PROGRESS NOTES
Subjective   Martir Johnson is a 63 y.o. male who presents for Diabetes Follow up    INTERVAL HX   Saw cardiology 12/2023 Dr. Dunlap.       T2DM  Current medication regimen:   -Farxiga 10mg, just started about 3 weeks ago, then ran out a week ago and just refilled  -Pioglitazone 30mg --- no longer taking, says told to stop by pharmacist  - stopped 12/2023 d/t GI side effects  Tolerating current medication regimen yes  CGM? no was to be set up last visit but did not follow up.   Hypoglycemic episodes no  Last A1C : 8.9  Last eGFR ->90  Last Creatinine - 0.9  Last microalbumin -    LAB REVIEW   Hemoglobin A1C (%)   Date Value   02/08/2024 8.9 (H)   11/17/2023 8.1 (H)   08/16/2023 8.2 (H)   04/18/2023 7.5 (H)   12/19/2022 7.5 (H)   01/26/2022 7.2 (H)     Glucose   Date Value   02/08/2024 197 mg/dL (H)   11/17/2023 172 mg/dL (H)   08/16/2023 114 MG/DL (H)   04/18/2023 165 MG/DL (H)   12/19/2022 148 MG/DL (H)     Creatinine   Date Value   02/08/2024 0.90 mg/dL   11/17/2023 0.90 mg/dL   08/16/2023 0.9 MG/DL   04/18/2023 0.9 MG/DL   12/19/2022 0.9 MG/DL     eGFR (mL/min/1.73m*2)   Date Value   02/08/2024 >90   11/17/2023 >90     ESTIMATED GFR (mL/min/1.73 m2)   Date Value   08/16/2023 95   04/18/2023 95   12/19/2022 96     Lab Results   Component Value Date    ALBUR 12 08/16/2023    CREATININE 0.90 02/08/2024     Lab Results   Component Value Date    CHOL 129 (L) 08/16/2023    CHOL 118 (L) 04/18/2023    CHOL 221 (H) 03/16/2023     Lab Results   Component Value Date    HDL 46 08/16/2023    HDL 47 04/18/2023    HDL 41 03/16/2023     Lab Results   Component Value Date    LDLCALC 62 (L) 08/16/2023    LDLCALC 54 (L) 04/18/2023    LDLCALC 147 (H) 03/16/2023     Lab Results   Component Value Date    TRIG 103 08/16/2023    TRIG 84 04/18/2023    TRIG 165 (H) 03/16/2023       ROS otherwise negative aside from what was mentioned above in HPI.      Objective   /72   Pulse 85   Wt 88.1 kg (194 lb 3.2 oz)   SpO2 98%   BMI  29.53 kg/m²     Physical Exam  Constitutional:       Appearance: Normal appearance.   Cardiovascular:      Rate and Rhythm: Normal rate and regular rhythm.      Heart sounds: Normal heart sounds. No murmur heard.  Pulmonary:      Effort: Pulmonary effort is normal.      Breath sounds: Normal breath sounds. No wheezing, rhonchi or rales.   Musculoskeletal:      Right lower leg: No edema.      Left lower leg: No edema.   Neurological:      Mental Status: He is alert.         Assessment/Plan   1. Type 2 diabetes mellitus with hyperglycemia, without long-term current use of insulin (CMS/[facility])  Needs better control. Is hesitant to adjust medication at this time. Is willing to work with clinical pharmacist on getting CGM set up and possible medication titration after getting set up with CGM.   - dapagliflozin propanediol (Farxiga) 10 mg; Take 1 tablet (10 mg) by mouth once daily.  Dispense: 90 tablet; Refill: 2    2. Hyperlipidemia, unspecified hyperlipidemia type  Lipid panel reviewed. Continue medication. Low fat diet. Regular exercise.  - ezetimibe (Zetia) 10 mg tablet; Take 1 tablet (10 mg) by mouth once daily at bedtime.  Dispense: 90 tablet; Refill: 2    3. Immunization due  - Flu vaccine (IIV4) age 6 months and greater, preservative free

## 2024-02-16 ENCOUNTER — CLINICAL SUPPORT (OUTPATIENT)
Dept: PRIMARY CARE | Facility: CLINIC | Age: 64
End: 2024-02-16

## 2024-02-16 DIAGNOSIS — E11.9 TYPE 2 DIABETES MELLITUS WITHOUT COMPLICATION, WITHOUT LONG-TERM CURRENT USE OF INSULIN (MULTI): Primary | ICD-10-CM

## 2024-02-16 NOTE — PROGRESS NOTES
CGM Personal Start     Martir Johnson presents to the office for his CGM personal start education and training. Referring Provider: Zara Castellanos MD    Pt brings [employer]estyle Heath 2 reader and supplies with him today.  States he has ongoing shoulder pain and will see specialist, Dr. Caballero next week.  Pt concerned that he may have to get x-rays at appt.          Hemoglobin A1C (%)   Date Value   02/08/2024 8.9 (H)   11/17/2023 8.1 (H)   08/16/2023 8.2 (H)   04/18/2023 7.5 (H)   12/19/2022 7.5 (H)   01/26/2022 7.2 (H)     Glucose   Date Value   02/08/2024 197 mg/dL (H)   11/17/2023 172 mg/dL (H)   08/16/2023 114 MG/DL (H)   04/18/2023 165 MG/DL (H)   12/19/2022 148 MG/DL (H)     Creatinine   Date Value   02/08/2024 0.90 mg/dL   11/17/2023 0.90 mg/dL   08/16/2023 0.9 MG/DL   04/18/2023 0.9 MG/DL   12/19/2022 0.9 MG/DL     eGFR (mL/min/1.73m*2)   Date Value   02/08/2024 >90   11/17/2023 >90     ESTIMATED GFR (mL/min/1.73 m2)   Date Value   08/16/2023 95   04/18/2023 95   12/19/2022 96       CURRENT PHARMACOTHERAPY  Medication Reconciliation completed with patient in office today   Current Outpatient Medications on File Prior to Visit   Medication Sig Dispense Refill    aspirin 81 mg EC tablet Take 1 tablet (81 mg) by mouth twice a day.      atorvastatin (Lipitor) 20 mg tablet Take 1 tablet (20 mg) by mouth once daily. 90 tablet 3    baclofen (Lioresal) 10 mg tablet TAKE ONE-HALF TO 1 TABLET BY MOUTH EVERY 8 HOURS AS NEEDED AS INSTRUCTED      dapagliflozin propanediol (Farxiga) 10 mg Take 1 tablet (10 mg) by mouth once daily. 30 tablet 5    ezetimibe (Zetia) 10 mg tablet Take 1 tablet (10 mg) by mouth once daily at bedtime. 90 tablet 3    FreeStyle Heath reader (FreeStyle Heath 2 [place]) misc Use as instructed 1 each 0    FreeStyle Heath sensor system (FreeStyle Heath 2 Sensor) kit Use as instructed every 14 days 2 each 5    gabapentin (Neurontin) 100 mg capsule Take 1 capsule (100 mg) by mouth once daily in the  morning. Take before meals.      gabapentin (Neurontin) 400 mg capsule Take 1 capsule (400 mg) by mouth once daily at bedtime.      glipiZIDE (Glucotrol) 10 mg tablet TAKE ONE TABLET BY MOUTH EVERY DAY 90 tablet 0    methocarbamol (Robaxin) 500 mg tablet TAKE ONE TABLET BY MOUTH THREE TIMES A DAY 90 tablet 0    metoprolol succinate XL (Toprol-XL) 25 mg 24 hr tablet Take 1 tablet (25 mg) by mouth 2 times a day. Do not crush or chew. 180 tablet 3    nitroglycerin (Nitrostat) 0.4 mg SL tablet Place 1 tablet (0.4 mg) under the tongue every 5 minutes if needed for chest pain. place 1 tablet under tongue every 5 minutes for up to 3 doses as needed for chest pain. Call 911 if pain persists      [DISCONTINUED] pioglitazone (Actos) 30 mg tablet Take 1 tablet (30 mg) by mouth once daily.       No current facility-administered medications on file prior to visit.       Allergies   Allergen Reactions    Codeine Hives    Iodine Rash    Metformin Diarrhea    Hydrocodone-Homatropine Hives    Nsaids (Non-Steroidal Anti-Inflammatory Drug) Unknown     gi upset         SMBG:  Testing at home? No  Recent FBS unknown     CGM:  Will start device after appt next week.  Pt to call office after visit with specialist.      Hypoglycemia tx/sx/prevention:   denies issues with low glucose  Reviewed sx and treatment  Advised to carry glucose source at all times    Assessment/Plan:   Pt brought all pill bottles for medication review.  Updated Medication list to reflect current treatment.    Start CGM at next OV.         Plan:  1.  Continue current medications  2.  Call office for appointment next week.        Freestyle Heath 3 personal start education and training provided by [employer] [facility]ly [facility], [facility]

## 2024-02-19 ENCOUNTER — OFFICE VISIT (OUTPATIENT)
Dept: ORTHOPEDIC SURGERY | Facility: HOSPITAL | Age: 64
End: 2024-02-19
Payer: MEDICARE

## 2024-02-19 ENCOUNTER — HOSPITAL ENCOUNTER (OUTPATIENT)
Dept: RADIOLOGY | Facility: HOSPITAL | Age: 64
Discharge: HOME | End: 2024-02-19
Payer: MEDICARE

## 2024-02-19 DIAGNOSIS — M25.511 RIGHT SHOULDER PAIN, UNSPECIFIED CHRONICITY: ICD-10-CM

## 2024-02-19 PROCEDURE — 73030 X-RAY EXAM OF SHOULDER: CPT | Mod: RT

## 2024-02-19 PROCEDURE — 2500000004 HC RX 250 GENERAL PHARMACY W/ HCPCS (ALT 636 FOR OP/ED): Performed by: ORTHOPAEDIC SURGERY

## 2024-02-19 PROCEDURE — 99214 OFFICE O/P EST MOD 30 MIN: CPT | Performed by: ORTHOPAEDIC SURGERY

## 2024-02-19 PROCEDURE — 3052F HG A1C>EQUAL 8.0%<EQUAL 9.0%: CPT | Performed by: ORTHOPAEDIC SURGERY

## 2024-02-19 PROCEDURE — 1036F TOBACCO NON-USER: CPT | Performed by: ORTHOPAEDIC SURGERY

## 2024-02-19 PROCEDURE — 20610 DRAIN/INJ JOINT/BURSA W/O US: CPT | Performed by: ORTHOPAEDIC SURGERY

## 2024-02-19 PROCEDURE — 73030 X-RAY EXAM OF SHOULDER: CPT | Mod: RIGHT SIDE | Performed by: RADIOLOGY

## 2024-02-19 PROCEDURE — 2500000005 HC RX 250 GENERAL PHARMACY W/O HCPCS: Performed by: ORTHOPAEDIC SURGERY

## 2024-02-19 RX ORDER — TRIAMCINOLONE ACETONIDE 40 MG/ML
40 INJECTION, SUSPENSION INTRA-ARTICULAR; INTRAMUSCULAR
Status: COMPLETED | OUTPATIENT
Start: 2024-02-19 | End: 2024-02-19

## 2024-02-19 RX ORDER — LIDOCAINE HYDROCHLORIDE 10 MG/ML
4 INJECTION INFILTRATION; PERINEURAL
Status: COMPLETED | OUTPATIENT
Start: 2024-02-19 | End: 2024-02-19

## 2024-02-19 RX ADMIN — TRIAMCINOLONE ACETONIDE 40 MG: 400 INJECTION, SUSPENSION INTRA-ARTICULAR; INTRAMUSCULAR at 14:38

## 2024-02-19 RX ADMIN — LIDOCAINE HYDROCHLORIDE 4 ML: 10 INJECTION, SOLUTION INFILTRATION; PERINEURAL at 14:38

## 2024-02-19 NOTE — PROGRESS NOTES
Patient is here for evaluation of right upper extremity pain he has pain over the sternoclavicular joint over the superior lateral shoulder trapezius, cervical spine.  He has numbness down the right arm and difficulty with elevation on the right shoulder.  He also has numbness down the left arm.  History of complex cervical spine surgery as well as thoracic surgery in the past.  He has recently seen his spine provider.    The patient is pleasant and cooperative.  The patient is alert and oriented ×3.  Auditory function is intact.  The patient is a good historian.  The patient is not in acute distress.  Eye exam significant for nonicteric sclera, intact ocular muscle movement.  Breathing is rhythmic symmetric and nonlabored.  The patient has multiple scars on his neck and integument over the right upper extremity is grossly intact.  His radial pulses easily palpable on the right side he has brisk capillary refill and no peripheral edema in the right hand.  Patient has decreased extension of the cervical spine which gives him a little bit of pain but no paresthesias.  He has nearly full range of motion of his right shoulder but pain on elevation above 90 degrees and internal rotation.  His motor power in abduction internal/external rotation and  strength are 5/5.  He has tenderness over the sternoclavicular joint.    X-rays show preservation subacromial glenohumeral joint space no fracture dislocation subluxation or arthritic degenerative changes.    Cervical radiculitis, sternoclavicular arthritis, subacromial bursitis.    Patient has combination of problems that are affecting upper extremities.  I do think he has an element of subacromial bursitis or rotator cuff syndrome.  I recommended subacromial injection as next treatment step.  Subacromial injection was performed today.  The patient tolerated it well and is to call back.  If he does not get relief in the sternoclavicular joint he may be candidate for  injection in that area as well.  If he does not get relief in the right shoulder I would recommend further evaluation by MRI scan.    Patient also has chronic tear of the left rotator cuff and has been contemplating surgery for that.  I do not recommend intervention yet until we have further resolution of the right shoulder problem.    This was dictated using voice recognition software and not corrected for grammatical or spelling errors.  L Inj/Asp: R subacromial bursa on 2/19/2024 2:38 PM  Indications: pain  Details: 22 G needle, posterior approach  Medications: 40 mg triamcinolone acetonide 40 mg/mL; 4 mL lidocaine 10 mg/mL (1 %)  Procedure, treatment alternatives, risks and benefits explained, specific risks discussed. Consent was given by the patient. Immediately prior to procedure a time out was called to verify the correct patient, procedure, equipment, support staff and site/side marked as required.

## 2024-02-21 RX ORDER — EZETIMIBE 10 MG/1
10 TABLET ORAL NIGHTLY
Qty: 90 TABLET | Refills: 2 | Status: SHIPPED | OUTPATIENT
Start: 2024-02-21 | End: 2025-02-20

## 2024-02-21 RX ORDER — DAPAGLIFLOZIN 10 MG/1
10 TABLET, FILM COATED ORAL DAILY
Qty: 90 TABLET | Refills: 2 | Status: SHIPPED | OUTPATIENT
Start: 2024-02-21

## 2024-02-23 DIAGNOSIS — D49.0 IPMN (INTRADUCTAL PAPILLARY MUCINOUS NEOPLASM): Primary | ICD-10-CM

## 2024-03-03 ENCOUNTER — HOSPITAL ENCOUNTER (EMERGENCY)
Facility: HOSPITAL | Age: 64
Discharge: HOME | End: 2024-03-03
Payer: COMMERCIAL

## 2024-03-03 VITALS
WEIGHT: 190 LBS | DIASTOLIC BLOOD PRESSURE: 68 MMHG | OXYGEN SATURATION: 97 % | RESPIRATION RATE: 19 BRPM | TEMPERATURE: 98 F | SYSTOLIC BLOOD PRESSURE: 128 MMHG | HEART RATE: 71 BPM | HEIGHT: 68 IN | BODY MASS INDEX: 28.79 KG/M2

## 2024-03-03 DIAGNOSIS — S61.213A LACERATION OF LEFT MIDDLE FINGER WITHOUT FOREIGN BODY WITHOUT DAMAGE TO NAIL, INITIAL ENCOUNTER: Primary | ICD-10-CM

## 2024-03-03 PROCEDURE — 12001 RPR S/N/AX/GEN/TRNK 2.5CM/<: CPT

## 2024-03-03 PROCEDURE — 90471 IMMUNIZATION ADMIN: CPT

## 2024-03-03 PROCEDURE — 90715 TDAP VACCINE 7 YRS/> IM: CPT

## 2024-03-03 PROCEDURE — 2500000005 HC RX 250 GENERAL PHARMACY W/O HCPCS

## 2024-03-03 PROCEDURE — 2500000004 HC RX 250 GENERAL PHARMACY W/ HCPCS (ALT 636 FOR OP/ED)

## 2024-03-03 PROCEDURE — 99283 EMERGENCY DEPT VISIT LOW MDM: CPT | Mod: 25

## 2024-03-03 RX ORDER — LIDOCAINE HYDROCHLORIDE 10 MG/ML
10 INJECTION INFILTRATION; PERINEURAL ONCE
Status: COMPLETED | OUTPATIENT
Start: 2024-03-03 | End: 2024-03-03

## 2024-03-03 RX ADMIN — LIDOCAINE HYDROCHLORIDE 100 MG: 10 INJECTION, SOLUTION INFILTRATION; PERINEURAL at 12:11

## 2024-03-03 RX ADMIN — TETANUS TOXOID, REDUCED DIPHTHERIA TOXOID AND ACELLULAR PERTUSSIS VACCINE, ADSORBED 0.5 ML: 5; 2.5; 8; 8; 2.5 SUSPENSION INTRAMUSCULAR at 11:46

## 2024-03-03 ASSESSMENT — PAIN SCALES - GENERAL
PAINLEVEL_OUTOF10: 0 - NO PAIN
PAINLEVEL_OUTOF10: 0 - NO PAIN

## 2024-03-03 ASSESSMENT — PAIN - FUNCTIONAL ASSESSMENT
PAIN_FUNCTIONAL_ASSESSMENT: 0-10
PAIN_FUNCTIONAL_ASSESSMENT: 0-10

## 2024-03-03 NOTE — ED PROVIDER NOTES
HPI   Chief Complaint   Patient presents with    Finger Laceration     Patient states he cut his finger on a soup can today. Does not take any blood thinners. States he has no pain but finger is sore.        Patient is a 63-year-old male presenting to the emergency department for evaluation of finger laceration.  Patient states he was pushing the garbage down when he realized there was a metal can lid from his dog's food not wrapped up and he sliced his finger on the can lid.  He states he tried to wash it out at home but was concerned it was continuing to bleed.  He states he is not on any blood thinners.  She does not know when his last tetanus vaccine was.  He denies any other trauma or injury.                          No data recorded                   Patient History   Past Medical History:   Diagnosis Date    Personal history of other diseases of the musculoskeletal system and connective tissue     History of low back pain     Past Surgical History:   Procedure Laterality Date    CARDIAC CATHETERIZATION      CARPAL TUNNEL RELEASE      CHOLECYSTECTOMY      JOINT REPLACEMENT      LUMBAR DISCECTOMY      OTHER SURGICAL HISTORY  07/18/2013    Spinal Stereotaxis Stimulation Of Cord     Family History   Problem Relation Name Age of Onset    Heart disease Mother      Diabetes Mother      Other (high blood pressure) Mother      Hyperlipidemia Mother      Diabetes Father      Other (high blood pressure) Father      Hyperlipidemia Father       Social History     Tobacco Use    Smoking status: Never    Smokeless tobacco: Never   Vaping Use    Vaping Use: Never used   Substance Use Topics    Alcohol use: Not Currently    Drug use: Never       Physical Exam   ED Triage Vitals   Temperature Heart Rate Respirations BP   03/03/24 1116 03/03/24 1107 03/03/24 1107 03/03/24 1107   36.7 °C (98 °F) 71 19 128/68      Pulse Ox Temp Source Heart Rate Source Patient Position   03/03/24 1107 03/03/24 1116 -- --   97 % Temporal        BP  Location FiO2 (%)     -- --             Physical Exam  GENERAL APPEARANCE: This patient is in no acute respiratory distress. Awake and alert. Talking appropriately. Answering questions appropriately. No evidence of pressured speech.    VITAL SIGNS: As per the nurses' triage record.    HEENT: Normocephalic, atraumatic.    NECK:  full gross ROM during exam    MUSCULOSKELETAL:  Full gross active range of motion. Ambulating on own with no acute difficulties     NEUROLOGICAL: Awake, alert and oriented x 3.    IMMUNOLOGICAL: No palpable lymphadenopathy or lymphatic streaking noted on visible skin.    DERM: 1.6 cm laceration to the tip of the left middle finger with no sign of foreign body. Full range of motion of the finger with no neurovascular compromise.     PSYCH: mood and affect appear normal.    ED Course & MDM   Diagnoses as of 03/03/24 1243   Laceration of left middle finger without foreign body without damage to nail, initial encounter       Medical Decision Making  **Disclaimer parts of this chart have been completed using voice recognition software. Please excuse any errors of transcription.     Evaluated this patient independently and my supervising physician was available for consultation.    HPI: Detailed above.    Exam: A medically appropriate exam performed, outlined above, given the known history and presentation.    History obtained from: Patient    EMERGENCY DEPARTMENT COURSE and DIFFERENTIAL DIAGNOSIS/MDM:  Patient is a 63-year-old male presenting to the emergency department for evaluation of left middle finger laceration.  On physical exam vital signs stable patient is in no acute distress.  He has a 1.6 cm laceration to the tip of the left middle finger with no sign of foreign body, no neurovascular compromise, no sign of tendon injury, and full range of motion of the finger.  Patient is unsure when his last tetanus vaccine was and therefore that was updated for him today.  Lidocaine was used to numb  "the finger using digital block technique.  Finger was soaked in chlorhexidine and scrubbed thoroughly.  Sutures placed.  Patient tolerated procedure well.  He will have sutures removed in 7 days by primary care physician or urgent care.  He will return to the emergency department with any new or worsening symptoms.        Vitals:    Vitals:    03/03/24 1107 03/03/24 1116   BP: 128/68    Pulse: 71    Resp: 19    Temp:  36.7 °C (98 °F)   TempSrc:  Temporal   SpO2: 97%    Weight: 86.2 kg (190 lb)    Height: 1.727 m (5' 8\")      History Limited by:    None    Independent history obtained from:    None      External records reviewed:    None      Diagnostics interpreted by me:    None      Discussions with other clinicians:    None      Chronic conditions impacting care:    None      Social determinants of health affecting care:    None    Diagnostic tests considered but not performed: None    ED Medications managed:    Medications   lidocaine (Xylocaine) 10 mg/mL (1 %) injection 100 mg (100 mg subcutaneous Given 3/3/24 1211)   diphth,pertus(acell),tetanus (BoostRIX) 2.5-8-5 Lf-mcg-Lf/0.5mL vaccine 0.5 mL (0.5 mL intramuscular Given 3/3/24 1146)       Prescription drugs considered:    None      Procedure  Laceration Repair    Performed by: Nichole Jackson PA-C  Authorized by: Nichole Jackson PA-C    Consent:     Consent obtained:  Verbal    Consent given by:  Patient    Risks, benefits, and alternatives were discussed: yes      Risks discussed:  Infection and pain    Alternatives discussed:  No treatment, delayed treatment and observation  Universal protocol:     Procedure explained and questions answered to patient or proxy's satisfaction: yes      Site/side marked: yes      Immediately prior to procedure, a time out was called: yes      Patient identity confirmed:  Arm band and verbally with patient  Anesthesia:     Anesthesia method:  Nerve block    Block location:  Left middle finger    Block needle gauge:  27 G    " Block anesthetic:  Lidocaine 1% w/o epi    Block technique:  Digital block    Block injection procedure:  Anatomic landmarks identified, anatomic landmarks palpated, negative aspiration for blood, incremental injection and introduced needle    Block outcome:  Anesthesia achieved  Laceration details:     Location:  Finger    Finger location:  L long finger    Length (cm):  1.6  Pre-procedure details:     Preparation:  Patient was prepped and draped in usual sterile fashion  Exploration:     Limited defect created (wound extended): no      Hemostasis achieved with:  Direct pressure    Wound exploration: wound explored through full range of motion and entire depth of wound visualized      Wound extent: no areolar tissue violation noted, no fascia violation noted, no foreign bodies/material noted, no muscle damage noted, no nerve damage noted, no tendon damage noted, no underlying fracture noted and no vascular damage noted      Contaminated: no    Treatment:     Area cleansed with:  Chlorhexidine    Amount of cleaning:  Standard    Debridement:  None  Skin repair:     Repair method:  Sutures    Suture size:  5-0    Suture material:  Nylon    Suture technique:  Simple interrupted    Number of sutures:  4  Approximation:     Approximation:  Close  Repair type:     Repair type:  Simple  Post-procedure details:     Dressing:  Bulky dressing    Procedure completion:  Tolerated well, no immediate complications       Nichole Jackson PA-C  03/03/24 1247

## 2024-03-06 ENCOUNTER — OFFICE VISIT (OUTPATIENT)
Dept: ORTHOPEDIC SURGERY | Facility: HOSPITAL | Age: 64
End: 2024-03-06
Payer: COMMERCIAL

## 2024-03-06 DIAGNOSIS — M25.511 RIGHT SHOULDER PAIN, UNSPECIFIED CHRONICITY: Primary | ICD-10-CM

## 2024-03-06 DIAGNOSIS — M75.101 TEAR OF RIGHT ROTATOR CUFF, UNSPECIFIED TEAR EXTENT, UNSPECIFIED WHETHER TRAUMATIC: ICD-10-CM

## 2024-03-06 PROCEDURE — 99213 OFFICE O/P EST LOW 20 MIN: CPT | Performed by: ORTHOPAEDIC SURGERY

## 2024-03-06 PROCEDURE — 1036F TOBACCO NON-USER: CPT | Performed by: ORTHOPAEDIC SURGERY

## 2024-03-06 PROCEDURE — 3052F HG A1C>EQUAL 8.0%<EQUAL 9.0%: CPT | Performed by: ORTHOPAEDIC SURGERY

## 2024-03-06 NOTE — PROGRESS NOTES
Patient here for follow-up of both shoulders right shoulder injection was not helpful continues to have pain with elevation.  Left shoulder has massive cuff tear and continues to be symptomatic he is interested in pursuing surgery sometime in the future the procedure would be a reverse arthroplasty.    Right shoulder painful arc of motion and pain on resisted abduction motor power in abduction 4/5 active elevation 150 with pain internal rotation PSIS external rotation 45 degrees.  Radial pulse easily palpable brisk capillary refill light touch sensation intact.    Left shoulder massive cuff tear right shoulder persistent pain despite treatment and possible cuff tear    Recommended MRI scan for the right shoulder.  Patient will follow-up with for that is been done.    This was dictated using voice recognition software and not corrected for grammatical or spelling errors.

## 2024-03-11 ENCOUNTER — HOSPITAL ENCOUNTER (OUTPATIENT)
Dept: RADIOLOGY | Facility: HOSPITAL | Age: 64
Discharge: HOME | End: 2024-03-11
Payer: COMMERCIAL

## 2024-03-11 ENCOUNTER — HOSPITAL ENCOUNTER (EMERGENCY)
Facility: HOSPITAL | Age: 64
Discharge: HOME | End: 2024-03-11
Payer: COMMERCIAL

## 2024-03-11 VITALS
BODY MASS INDEX: 28.6 KG/M2 | TEMPERATURE: 97.9 F | DIASTOLIC BLOOD PRESSURE: 53 MMHG | HEART RATE: 76 BPM | RESPIRATION RATE: 18 BRPM | OXYGEN SATURATION: 95 % | HEIGHT: 68 IN | WEIGHT: 188.71 LBS | SYSTOLIC BLOOD PRESSURE: 96 MMHG

## 2024-03-11 DIAGNOSIS — Z48.02 ENCOUNTER FOR REMOVAL OF SUTURES: ICD-10-CM

## 2024-03-11 DIAGNOSIS — D49.0 IPMN (INTRADUCTAL PAPILLARY MUCINOUS NEOPLASM): ICD-10-CM

## 2024-03-11 DIAGNOSIS — T14.8XXD ENCOUNTER FOR RE-CHECK OF LACERATION WOUND: Primary | ICD-10-CM

## 2024-03-11 PROCEDURE — A9575 INJ GADOTERATE MEGLUMI 0.1ML: HCPCS | Performed by: PHYSICIAN ASSISTANT

## 2024-03-11 PROCEDURE — 74183 MRI ABD W/O CNTR FLWD CNTR: CPT | Performed by: RADIOLOGY

## 2024-03-11 PROCEDURE — 2550000001 HC RX 255 CONTRASTS: Performed by: PHYSICIAN ASSISTANT

## 2024-03-11 PROCEDURE — 99281 EMR DPT VST MAYX REQ PHY/QHP: CPT

## 2024-03-11 PROCEDURE — 76376 3D RENDER W/INTRP POSTPROCES: CPT | Performed by: RADIOLOGY

## 2024-03-11 PROCEDURE — 74183 MRI ABD W/O CNTR FLWD CNTR: CPT

## 2024-03-11 RX ORDER — GADOTERATE MEGLUMINE 376.9 MG/ML
18 INJECTION INTRAVENOUS
Status: COMPLETED | OUTPATIENT
Start: 2024-03-11 | End: 2024-03-11

## 2024-03-11 RX ADMIN — GADOTERATE MEGLUMINE 18 ML: 376.9 INJECTION INTRAVENOUS at 13:00

## 2024-03-11 ASSESSMENT — COLUMBIA-SUICIDE SEVERITY RATING SCALE - C-SSRS
2. HAVE YOU ACTUALLY HAD ANY THOUGHTS OF KILLING YOURSELF?: NO
6. HAVE YOU EVER DONE ANYTHING, STARTED TO DO ANYTHING, OR PREPARED TO DO ANYTHING TO END YOUR LIFE?: NO
1. IN THE PAST MONTH, HAVE YOU WISHED YOU WERE DEAD OR WISHED YOU COULD GO TO SLEEP AND NOT WAKE UP?: NO

## 2024-03-11 NOTE — ED PROVIDER NOTES
HPI   Chief Complaint   Patient presents with    Suture / Staple Removal     I need my sutures removed fro my lt middle finger       63-year-old male presented emergency department with a chief complaint of wound check, suture removal.  Mild March 3 he had 4 stitches placed in his distal left third upper extremity digit after being cut on a dog food can.  He states the wound has healed well.  He has no other complaints.  No discharge or drainage.                          Olesya Coma Scale Score: 15                     Patient History   Past Medical History:   Diagnosis Date    Personal history of other diseases of the musculoskeletal system and connective tissue     History of low back pain     Past Surgical History:   Procedure Laterality Date    CARDIAC CATHETERIZATION      CARPAL TUNNEL RELEASE      CHOLECYSTECTOMY      JOINT REPLACEMENT      LUMBAR DISCECTOMY      OTHER SURGICAL HISTORY  07/18/2013    Spinal Stereotaxis Stimulation Of Cord     Family History   Problem Relation Name Age of Onset    Heart disease Mother      Diabetes Mother      Other (high blood pressure) Mother      Hyperlipidemia Mother      Diabetes Father      Other (high blood pressure) Father      Hyperlipidemia Father       Social History     Tobacco Use    Smoking status: Never    Smokeless tobacco: Never   Vaping Use    Vaping Use: Never used   Substance Use Topics    Alcohol use: Not Currently    Drug use: Never       Physical Exam   ED Triage Vitals [03/11/24 1347]   Temperature Heart Rate Respirations BP   36.6 °C (97.9 °F) 76 18 96/53      Pulse Ox Temp Source Heart Rate Source Patient Position   95 % Oral Monitor Sitting      BP Location FiO2 (%)     Left arm --       Physical Exam  Vitals and nursing note reviewed.   Constitutional:       Appearance: Normal appearance.   HENT:      Head: Normocephalic.      Mouth/Throat:      Mouth: Mucous membranes are moist.   Eyes:      Pupils: Pupils are equal, round, and reactive to light.    Cardiovascular:      Rate and Rhythm: Normal rate and regular rhythm.   Pulmonary:      Effort: Pulmonary effort is normal.      Breath sounds: Normal breath sounds.   Abdominal:      General: Abdomen is flat.      Palpations: Abdomen is soft.   Musculoskeletal:         General: Normal range of motion.      Cervical back: Normal range of motion.   Skin:     General: Skin is warm and dry.      Comments: Well-healing wound   Neurological:      General: No focal deficit present.      Mental Status: He is alert and oriented to person, place, and time.   Psychiatric:         Mood and Affect: Mood normal.         ED Course & MDM   Diagnoses as of 03/11/24 1409   Encounter for re-check of laceration wound   Encounter for removal of sutures       Medical Decision Making  I have seen and evaluated this patient.  Physician available for consultation.  Vital signs have been reviewed.  All laboratory and diagnostic imaging is reviewed by myself and interpreted by myself unless otherwise stated.  Additionally imaging is interpreted by radiologist.    Wound is cleansed, sutures removed without complication.  Wound is again cleansed and Band-Aid is placed.  Given wound care instructions.  Released in good condition to follow with primary physician.    Labs Reviewed - No data to display  No orders to display     Medications - No data to display  Discharge Medication List as of 3/11/2024  1:57 PM            Procedure  Suture Removal    Performed by: Martir Lemus PA-C  Authorized by: Martir Lemus PA-C    Consent:     Consent obtained:  Verbal  Universal protocol:     Patient identity confirmed:  Verbally with patient  Location:     Location:  Upper extremity    Upper extremity location:  Hand    Hand location:  L long finger  Procedure details:     Wound appearance:  No signs of infection    Number of sutures removed:  4  Post-procedure details:     Post-removal:  Band-Aid applied    Procedure completion:  Tolerated        Martir Lemus PA-C  03/11/24 1414

## 2024-03-21 DIAGNOSIS — M54.10 RADICULITIS: ICD-10-CM

## 2024-03-26 ENCOUNTER — HOSPITAL ENCOUNTER (OUTPATIENT)
Dept: RADIOLOGY | Facility: HOSPITAL | Age: 64
Discharge: HOME | End: 2024-03-26
Payer: COMMERCIAL

## 2024-03-26 DIAGNOSIS — M75.101 TEAR OF RIGHT ROTATOR CUFF, UNSPECIFIED TEAR EXTENT, UNSPECIFIED WHETHER TRAUMATIC: ICD-10-CM

## 2024-03-26 PROCEDURE — 73221 MRI JOINT UPR EXTREM W/O DYE: CPT | Mod: RIGHT SIDE | Performed by: RADIOLOGY

## 2024-03-26 PROCEDURE — 73221 MRI JOINT UPR EXTREM W/O DYE: CPT | Mod: RT

## 2024-03-27 RX ORDER — METHOCARBAMOL 500 MG/1
500 TABLET, FILM COATED ORAL 3 TIMES DAILY
Qty: 90 TABLET | Refills: 0 | Status: SHIPPED | OUTPATIENT
Start: 2024-03-27 | End: 2024-04-09 | Stop reason: SDUPTHER

## 2024-04-05 DIAGNOSIS — M54.10 RADICULITIS: ICD-10-CM

## 2024-04-09 DIAGNOSIS — M54.10 RADICULITIS: ICD-10-CM

## 2024-04-09 RX ORDER — METHOCARBAMOL 500 MG/1
500 TABLET, FILM COATED ORAL 3 TIMES DAILY
Qty: 90 TABLET | Refills: 0 | Status: SHIPPED | OUTPATIENT
Start: 2024-04-09 | End: 2024-05-28

## 2024-04-09 RX ORDER — METHOCARBAMOL 500 MG/1
500 TABLET, FILM COATED ORAL 3 TIMES DAILY
Qty: 90 TABLET | Refills: 0 | OUTPATIENT
Start: 2024-04-09

## 2024-04-16 ENCOUNTER — OFFICE VISIT (OUTPATIENT)
Dept: CARDIOLOGY | Facility: HOSPITAL | Age: 64
End: 2024-04-16
Payer: COMMERCIAL

## 2024-04-16 VITALS
HEART RATE: 64 BPM | BODY MASS INDEX: 28.34 KG/M2 | HEIGHT: 68 IN | DIASTOLIC BLOOD PRESSURE: 70 MMHG | SYSTOLIC BLOOD PRESSURE: 125 MMHG | WEIGHT: 187 LBS

## 2024-04-16 DIAGNOSIS — I25.10 CORONARY ARTERY DISEASE, UNSPECIFIED VESSEL OR LESION TYPE, UNSPECIFIED WHETHER ANGINA PRESENT, UNSPECIFIED WHETHER NATIVE OR TRANSPLANTED HEART: Primary | ICD-10-CM

## 2024-04-16 PROCEDURE — 93005 ELECTROCARDIOGRAM TRACING: CPT | Performed by: INTERNAL MEDICINE

## 2024-04-16 PROCEDURE — 3078F DIAST BP <80 MM HG: CPT | Performed by: INTERNAL MEDICINE

## 2024-04-16 PROCEDURE — 1159F MED LIST DOCD IN RCRD: CPT | Performed by: INTERNAL MEDICINE

## 2024-04-16 PROCEDURE — 3052F HG A1C>EQUAL 8.0%<EQUAL 9.0%: CPT | Performed by: INTERNAL MEDICINE

## 2024-04-16 PROCEDURE — 99214 OFFICE O/P EST MOD 30 MIN: CPT | Performed by: INTERNAL MEDICINE

## 2024-04-16 PROCEDURE — 1160F RVW MEDS BY RX/DR IN RCRD: CPT | Performed by: INTERNAL MEDICINE

## 2024-04-16 PROCEDURE — 1036F TOBACCO NON-USER: CPT | Performed by: INTERNAL MEDICINE

## 2024-04-16 PROCEDURE — 3074F SYST BP LT 130 MM HG: CPT | Performed by: INTERNAL MEDICINE

## 2024-04-16 NOTE — PROGRESS NOTES
Subjective:  Patient returns for a routine 4-month follow-up.  He is a 64-year-old gentleman with documented severe and diffuse distal LAD and distal RCA disease.  We have opted to manage him medically since our revascularization options appear to be somewhat limited.    He generally has been doing reasonably well.  He is staying reasonably active with some occasional angina but nothing overly problematic.  He denies any severe or prolonged episodes of chest discomfort.  He is taking all of his medications compliantly and is tolerating them well.  He continues to work on his diabetic control.  He is struggling with some arthritis and may need some left shoulder surgery in the not-too-distant future.    He has not had any hospitalizations and denies any other new health concerns.    Objective:  General: Alert, usual pleasant self.  HEENT: Unchanged.  Lungs: Clear without crackles.  Cardiac: Unchanged without murmur.  Abdomen: Nontender.  Extremities: No edema.  Skin: No rash.  Neuro: Grossly intact.    EKG: Normal sinus rhythm.  Within normal limits.    Lipid panel: Cholesterol-129, HDL-46, LDL-62, TG-103.    Impression/plan:  Martir generally remains reasonably stable clinically with his extensive coronary disease.  He is not having any clear escalation in his anginal symptomatology.  His heart rate and blood pressure are nicely controlled on his current medical regimen.  He remains on appropriate antiplatelet therapy.  His lipid panel also looks quite good on combination therapy with atorvastatin and Zetia.    I rereviewed his angiogram in detail and did not think we needed to consider any heroic revascularization options for this gentleman at this time.  I do think he is acceptable risk to have his shoulder surgery if this is deemed necessary.  I took the liberty of renewing some of his medications for him.    Patient instructions:    Continue current medications unchanged.    You are cleared for shoulder surgery  if needed.    Return to clinic in 4 months.

## 2024-04-19 ENCOUNTER — OFFICE VISIT (OUTPATIENT)
Dept: ORTHOPEDIC SURGERY | Facility: HOSPITAL | Age: 64
End: 2024-04-19
Payer: COMMERCIAL

## 2024-04-19 DIAGNOSIS — M75.111 INCOMPLETE TEAR OF RIGHT ROTATOR CUFF, UNSPECIFIED WHETHER TRAUMATIC: Primary | ICD-10-CM

## 2024-04-19 PROCEDURE — 99213 OFFICE O/P EST LOW 20 MIN: CPT | Performed by: ORTHOPAEDIC SURGERY

## 2024-04-19 PROCEDURE — 1160F RVW MEDS BY RX/DR IN RCRD: CPT | Performed by: ORTHOPAEDIC SURGERY

## 2024-04-19 PROCEDURE — 3052F HG A1C>EQUAL 8.0%<EQUAL 9.0%: CPT | Performed by: ORTHOPAEDIC SURGERY

## 2024-04-19 PROCEDURE — 1159F MED LIST DOCD IN RCRD: CPT | Performed by: ORTHOPAEDIC SURGERY

## 2024-04-19 RX ORDER — MELOXICAM 15 MG/1
15 TABLET ORAL DAILY
Qty: 10 TABLET | Refills: 0 | Status: SHIPPED | OUTPATIENT
Start: 2024-04-19 | End: 2024-04-29

## 2024-04-19 NOTE — PROGRESS NOTES
Here to review the MRI scan of his right shoulder.  Symptoms of pain in the shoulder particularly at nighttime.  He also has a large rotator cuff tear on the left side and is interested in surgery in June.  MRI scan shows partial-thickness tear intrasubstance right rotator cuff otherwise cuff is intact.  Glenohumeral joint minimally arthritic.    Partial-thickness rotator cuff tear right shoulder    We discussed options for further treatment having not really responded to the left injection possible reinjection was discussed patient would like to try anti-inflammatory medicine first.  A prescription for meloxicam was sent to the patient's pharmacy and he is going to call us to let us know how he is doing next week.  If this does not give him relief I would have him consider repeat injection.    This was dictated using voice recognition software and not corrected for grammatical or spelling errors.

## 2024-06-09 DIAGNOSIS — E11.9 TYPE 2 DIABETES MELLITUS WITHOUT COMPLICATION, WITHOUT LONG-TERM CURRENT USE OF INSULIN (MULTI): ICD-10-CM

## 2024-06-10 RX ORDER — GLIPIZIDE 10 MG/1
10 TABLET ORAL DAILY
Qty: 90 TABLET | Refills: 0 | Status: SHIPPED | OUTPATIENT
Start: 2024-06-10

## 2024-06-11 PROBLEM — N28.9 KIDNEY LESION: Status: ACTIVE | Noted: 2024-06-11

## 2024-06-11 PROBLEM — M54.50 LOW BACK PAIN: Status: RESOLVED | Noted: 2023-11-21 | Resolved: 2024-06-11

## 2024-06-11 PROBLEM — R19.4 CHANGE IN BOWEL HABITS: Status: RESOLVED | Noted: 2022-12-21 | Resolved: 2024-06-11

## 2024-06-11 PROBLEM — R19.8 ALTERED BOWEL FUNCTION: Status: RESOLVED | Noted: 2022-12-21 | Resolved: 2024-06-11

## 2024-06-11 PROBLEM — M62.81 MUSCLE WEAKNESS (GENERALIZED): Status: RESOLVED | Noted: 2023-11-21 | Resolved: 2024-06-11

## 2024-06-11 PROBLEM — M25.569 KNEE PAIN: Status: RESOLVED | Noted: 2022-02-04 | Resolved: 2024-06-11

## 2024-06-11 PROBLEM — R11.2 NAUSEA & VOMITING: Status: RESOLVED | Noted: 2023-11-21 | Resolved: 2024-06-11

## 2024-06-11 PROBLEM — R19.8 GENERAL FINDING OF ABDOMEN: Status: RESOLVED | Noted: 2022-12-07 | Resolved: 2024-06-11

## 2024-06-11 PROBLEM — H61.20 IMPACTED CERUMEN: Status: RESOLVED | Noted: 2024-06-11 | Resolved: 2024-06-11

## 2024-06-11 PROBLEM — Z96.659 HISTORY OF TOTAL KNEE REPLACEMENT: Status: ACTIVE | Noted: 2021-08-24

## 2024-06-11 PROBLEM — J11.1 INFLUENZA: Status: RESOLVED | Noted: 2023-11-21 | Resolved: 2024-06-11

## 2024-06-11 PROBLEM — M25.511 PAIN OF RIGHT SHOULDER REGION: Status: RESOLVED | Noted: 2024-06-11 | Resolved: 2024-06-11

## 2024-06-11 PROBLEM — M25.519 SHOULDER PAIN: Status: RESOLVED | Noted: 2023-11-21 | Resolved: 2024-06-11

## 2024-06-11 PROBLEM — R06.02 SHORTNESS OF BREATH: Status: ACTIVE | Noted: 2019-05-19

## 2024-06-11 PROBLEM — S91.309A WOUND, OPEN, FOOT: Status: RESOLVED | Noted: 2024-06-11 | Resolved: 2024-06-11

## 2024-06-11 PROBLEM — M54.2 NECK PAIN: Status: RESOLVED | Noted: 2022-03-29 | Resolved: 2024-06-11

## 2024-06-11 PROBLEM — K56.609 INTESTINAL OBSTRUCTION DUE TO DECREASED PERISTALSIS (MULTI): Status: RESOLVED | Noted: 2024-06-11 | Resolved: 2024-06-11

## 2024-06-11 PROBLEM — R09.81 NASAL CONGESTION: Status: RESOLVED | Noted: 2021-12-03 | Resolved: 2024-06-11

## 2024-06-11 PROBLEM — R07.81 RIB PAIN: Status: RESOLVED | Noted: 2023-11-21 | Resolved: 2024-06-11

## 2024-06-11 PROBLEM — D13.6 BENIGN NEOPLASM OF PANCREAS: Status: ACTIVE | Noted: 2024-06-11

## 2024-06-11 PROBLEM — S91.309A OPEN WOUND OF FOOT: Status: ACTIVE | Noted: 2024-06-11

## 2024-06-11 PROBLEM — R19.8 INTESTINAL OBSTRUCTION DUE TO DECREASED PERISTALSIS (MULTI): Status: RESOLVED | Noted: 2024-06-11 | Resolved: 2024-06-11

## 2024-06-11 PROBLEM — M54.9 BACKACHE: Status: RESOLVED | Noted: 2023-11-21 | Resolved: 2024-06-11

## 2024-06-11 PROBLEM — R19.8 GENERAL FINDING OF ABDOMEN: Status: RESOLVED | Noted: 2024-06-11 | Resolved: 2024-06-11

## 2024-06-11 PROBLEM — R07.9 CHEST PAIN: Status: RESOLVED | Noted: 2019-02-19 | Resolved: 2024-06-11

## 2024-06-11 PROBLEM — E78.00 HYPERCHOLESTEROLEMIA: Status: ACTIVE | Noted: 2019-06-13

## 2024-06-11 PROBLEM — M62.81 MUSCLE WEAKNESS: Status: RESOLVED | Noted: 2023-11-21 | Resolved: 2024-06-11

## 2024-06-11 PROBLEM — L98.9 SKIN LESION: Status: RESOLVED | Noted: 2022-03-04 | Resolved: 2024-06-11

## 2024-06-11 PROBLEM — R05.9 COUGH: Status: RESOLVED | Noted: 2019-05-14 | Resolved: 2024-06-11

## 2024-06-13 ENCOUNTER — APPOINTMENT (OUTPATIENT)
Dept: PRIMARY CARE | Facility: CLINIC | Age: 64
End: 2024-06-13
Payer: COMMERCIAL

## 2024-07-23 ENCOUNTER — TELEPHONE (OUTPATIENT)
Dept: PRIMARY CARE | Facility: CLINIC | Age: 64
End: 2024-07-23
Payer: COMMERCIAL

## 2024-07-23 NOTE — TELEPHONE ENCOUNTER
Patient is getting new insurance and the Farxiga is costly. Is there a cheaper alternative you can prescribe for him?

## 2024-07-24 NOTE — TELEPHONE ENCOUNTER
Please get new insurance information and then forward to Sofie to see if we can assist in coverage/new med rx

## 2024-07-25 ENCOUNTER — TELEPHONE (OUTPATIENT)
Dept: PRIMARY CARE | Facility: CLINIC | Age: 64
End: 2024-07-25
Payer: COMMERCIAL

## 2024-07-25 DIAGNOSIS — E11.9 TYPE 2 DIABETES MELLITUS WITHOUT COMPLICATION, WITHOUT LONG-TERM CURRENT USE OF INSULIN (MULTI): Primary | ICD-10-CM

## 2024-07-25 DIAGNOSIS — E11.65 TYPE 2 DIABETES MELLITUS WITH HYPERGLYCEMIA, WITHOUT LONG-TERM CURRENT USE OF INSULIN (MULTI): ICD-10-CM

## 2024-07-25 PROCEDURE — RXMED WILLOW AMBULATORY MEDICATION CHARGE

## 2024-07-25 RX ORDER — DAPAGLIFLOZIN 10 MG/1
10 TABLET, FILM COATED ORAL DAILY
Qty: 90 TABLET | Refills: 2 | Status: SHIPPED | OUTPATIENT
Start: 2024-07-25

## 2024-07-26 ENCOUNTER — PHARMACY VISIT (OUTPATIENT)
Dept: PHARMACY | Facility: CLINIC | Age: 64
End: 2024-07-26
Payer: COMMERCIAL

## 2024-08-13 DIAGNOSIS — M54.10 RADICULITIS: ICD-10-CM

## 2024-08-14 ENCOUNTER — LAB (OUTPATIENT)
Dept: LAB | Facility: LAB | Age: 64
End: 2024-08-14
Payer: MEDICARE

## 2024-08-14 DIAGNOSIS — E11.9 TYPE 2 DIABETES MELLITUS WITHOUT COMPLICATION, WITHOUT LONG-TERM CURRENT USE OF INSULIN (MULTI): ICD-10-CM

## 2024-08-14 LAB
ALBUMIN SERPL-MCNC: 4.4 G/DL (ref 3.5–5)
ALP BLD-CCNC: 61 U/L (ref 35–125)
ALT SERPL-CCNC: 23 U/L (ref 5–40)
ANION GAP SERPL CALC-SCNC: 14 MMOL/L
AST SERPL-CCNC: 21 U/L (ref 5–40)
BILIRUB SERPL-MCNC: 1 MG/DL (ref 0.1–1.2)
BUN SERPL-MCNC: 16 MG/DL (ref 8–25)
CALCIUM SERPL-MCNC: 9.3 MG/DL (ref 8.5–10.4)
CHLORIDE SERPL-SCNC: 102 MMOL/L (ref 97–107)
CO2 SERPL-SCNC: 25 MMOL/L (ref 24–31)
CREAT SERPL-MCNC: 0.9 MG/DL (ref 0.4–1.6)
EGFRCR SERPLBLD CKD-EPI 2021: >90 ML/MIN/1.73M*2
EST. AVERAGE GLUCOSE BLD GHB EST-MCNC: 252 MG/DL
GLUCOSE SERPL-MCNC: 178 MG/DL (ref 65–99)
HBA1C MFR BLD: 10.4 %
POTASSIUM SERPL-SCNC: 4.3 MMOL/L (ref 3.4–5.1)
PROT SERPL-MCNC: 7.2 G/DL (ref 5.9–7.9)
SODIUM SERPL-SCNC: 141 MMOL/L (ref 133–145)

## 2024-08-14 PROCEDURE — 83036 HEMOGLOBIN GLYCOSYLATED A1C: CPT

## 2024-08-14 PROCEDURE — 80053 COMPREHEN METABOLIC PANEL: CPT

## 2024-08-14 RX ORDER — METHOCARBAMOL 500 MG/1
500 TABLET, FILM COATED ORAL 3 TIMES DAILY
Qty: 90 TABLET | Refills: 0 | Status: SHIPPED | OUTPATIENT
Start: 2024-08-14

## 2024-08-16 ENCOUNTER — TELEPHONE (OUTPATIENT)
Dept: PRIMARY CARE | Facility: CLINIC | Age: 64
End: 2024-08-16

## 2024-08-16 ENCOUNTER — APPOINTMENT (OUTPATIENT)
Dept: PRIMARY CARE | Facility: CLINIC | Age: 64
End: 2024-08-16
Payer: COMMERCIAL

## 2024-08-16 VITALS
HEART RATE: 74 BPM | DIASTOLIC BLOOD PRESSURE: 62 MMHG | OXYGEN SATURATION: 94 % | BODY MASS INDEX: 27.83 KG/M2 | SYSTOLIC BLOOD PRESSURE: 104 MMHG | TEMPERATURE: 97.5 F | WEIGHT: 183 LBS

## 2024-08-16 DIAGNOSIS — E11.65 TYPE 2 DIABETES MELLITUS WITH HYPERGLYCEMIA, WITHOUT LONG-TERM CURRENT USE OF INSULIN (MULTI): Primary | ICD-10-CM

## 2024-08-16 DIAGNOSIS — Z79.899 MEDICATION MANAGEMENT: ICD-10-CM

## 2024-08-16 DIAGNOSIS — Z12.5 SCREENING FOR PROSTATE CANCER: ICD-10-CM

## 2024-08-16 DIAGNOSIS — G99.2 MYELOPATHY IN DISEASES CLASSIFIED ELSEWHERE (MULTI): ICD-10-CM

## 2024-08-16 DIAGNOSIS — M46.00: ICD-10-CM

## 2024-08-16 DIAGNOSIS — E11.9 TYPE 2 DIABETES MELLITUS WITHOUT COMPLICATION, WITHOUT LONG-TERM CURRENT USE OF INSULIN (MULTI): ICD-10-CM

## 2024-08-16 DIAGNOSIS — E78.5 HYPERLIPIDEMIA, UNSPECIFIED HYPERLIPIDEMIA TYPE: ICD-10-CM

## 2024-08-16 PROCEDURE — 1159F MED LIST DOCD IN RCRD: CPT | Performed by: STUDENT IN AN ORGANIZED HEALTH CARE EDUCATION/TRAINING PROGRAM

## 2024-08-16 PROCEDURE — 3046F HEMOGLOBIN A1C LEVEL >9.0%: CPT | Performed by: STUDENT IN AN ORGANIZED HEALTH CARE EDUCATION/TRAINING PROGRAM

## 2024-08-16 PROCEDURE — 1036F TOBACCO NON-USER: CPT | Performed by: STUDENT IN AN ORGANIZED HEALTH CARE EDUCATION/TRAINING PROGRAM

## 2024-08-16 PROCEDURE — 99214 OFFICE O/P EST MOD 30 MIN: CPT | Performed by: STUDENT IN AN ORGANIZED HEALTH CARE EDUCATION/TRAINING PROGRAM

## 2024-08-16 PROCEDURE — 1125F AMNT PAIN NOTED PAIN PRSNT: CPT | Performed by: STUDENT IN AN ORGANIZED HEALTH CARE EDUCATION/TRAINING PROGRAM

## 2024-08-16 PROCEDURE — 3074F SYST BP LT 130 MM HG: CPT | Performed by: STUDENT IN AN ORGANIZED HEALTH CARE EDUCATION/TRAINING PROGRAM

## 2024-08-16 PROCEDURE — 3078F DIAST BP <80 MM HG: CPT | Performed by: STUDENT IN AN ORGANIZED HEALTH CARE EDUCATION/TRAINING PROGRAM

## 2024-08-16 ASSESSMENT — PATIENT HEALTH QUESTIONNAIRE - PHQ9
1. LITTLE INTEREST OR PLEASURE IN DOING THINGS: NOT AT ALL
2. FEELING DOWN, DEPRESSED OR HOPELESS: NOT AT ALL
SUM OF ALL RESPONSES TO PHQ9 QUESTIONS 1 AND 2: 0

## 2024-08-16 ASSESSMENT — PAIN SCALES - GENERAL: PAINLEVEL: 8

## 2024-08-16 NOTE — TELEPHONE ENCOUNTER
This was to be a CPE with DM check -- please change to 30 min slot and order CPE labs in addition to the DM ones. Thanks     Changed appointment to Welcome to Medicare/Asthma

## 2024-08-16 NOTE — PROGRESS NOTES
Subjective   Martir Johnson is a 64 y.o. male who presents for Diabetes Follow up    INTERVAL HX   Lost insurance for a bit, is now insured again. Working on getting supplemental    HPI   T2DM  Current medication regimen:   -Farxiga 10 mg  ---was off the month of July due to insurance, started back earlier this month.   Glipizide 10 mg -- thinks taking   --- no longer taking, says told to stop by pharmacist  - stopped 12/2023 d/t GI side effects  Tolerating current medication regimen yes  CGM? no was to be set up last visit but did not follow up. Was too busy  Hypoglycemic episodes no  Last A1C : 10.4, 8.9  Last eGFR ->90  Last Creatinine - 0.9  Last microalbumin - ordered, not done with labs  Denies any change in diet. Not checking blood sugars. Never followed up to get CGM.     LAB REVIEW   Hemoglobin A1C (%)   Date Value   08/14/2024 10.4 (H)   02/08/2024 8.9 (H)   11/17/2023 8.1 (H)     Glucose (mg/dL)   Date Value   08/14/2024 178 (H)   02/08/2024 197 (H)   11/17/2023 172 (H)     Creatinine (mg/dL)   Date Value   08/14/2024 0.90   02/08/2024 0.90   11/17/2023 0.90     eGFR (mL/min/1.73m*2)   Date Value   08/14/2024 >90   02/08/2024 >90   11/17/2023 >90     Lab Results   Component Value Date    ALBUR 12 08/16/2023    CREATININE 0.90 08/14/2024     Lab Results   Component Value Date    CHOL 129 (L) 08/16/2023    CHOL 118 (L) 04/18/2023    CHOL 221 (H) 03/16/2023     Lab Results   Component Value Date    HDL 46 08/16/2023    HDL 47 04/18/2023    HDL 41 03/16/2023     Lab Results   Component Value Date    LDLCALC 62 (L) 08/16/2023    LDLCALC 54 (L) 04/18/2023    LDLCALC 147 (H) 03/16/2023     Lab Results   Component Value Date    TRIG 103 08/16/2023    TRIG 84 04/18/2023    TRIG 165 (H) 03/16/2023       ROS otherwise negative aside from what was mentioned above in HPI.      Objective   /62 (BP Location: Left arm)   Pulse 74   Temp 36.4 °C (97.5 °F) (Temporal)   Wt 83 kg (183 lb)   SpO2 94%   BMI 27.83  kg/m²     Physical Exam  Constitutional:       Appearance: Normal appearance.   Cardiovascular:      Rate and Rhythm: Normal rate and regular rhythm.      Heart sounds: Normal heart sounds. No murmur heard.  Pulmonary:      Effort: Pulmonary effort is normal.      Breath sounds: Normal breath sounds. No wheezing, rhonchi or rales.   Musculoskeletal:      Right lower leg: No edema.      Left lower leg: No edema.   Neurological:      Mental Status: He is alert.         Assessment/Plan   1. Type 2 diabetes mellitus with hyperglycemia, without long-term current use of insulin (Multi)  Uncontrolled  Continue current medication regimen.   Patient is on ASA, and statin  Lipid panel checked in last year  Routine diabetic retinopathy screening - needs to schedule  Discussed dietary efforts and physical activity  Recommend medication titration. Would benefit from GLP-1 agonist/SLGT2i. He is willing to initiate new medication if affordable. Also open to CGM. Agreeable to meet with Sofie Kevin in pharmacy for medication start and CGM. Referral placed. Will schedule.     2. 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 No acute concerns.     3. Spinal enthesopathy (CMS-HCC)  Under ortho spine s/p thoracic decompression and fusion  with orthopedic surgery. No acute concerns.

## 2024-08-24 PROBLEM — M54.50 ACUTE LOW BACK PAIN: Status: RESOLVED | Noted: 2023-11-21 | Resolved: 2024-08-24

## 2024-08-24 PROBLEM — M79.606 PAIN OF LOWER EXTREMITY: Status: RESOLVED | Noted: 2023-11-21 | Resolved: 2024-08-24

## 2024-08-24 PROBLEM — H61.23 BILATERAL IMPACTED CERUMEN: Status: RESOLVED | Noted: 2021-12-03 | Resolved: 2024-08-24

## 2024-08-24 PROBLEM — I20.9 ANGINA PECTORIS (CMS-HCC): Status: RESOLVED | Noted: 2023-11-21 | Resolved: 2024-08-24

## 2024-08-24 PROBLEM — R97.20 HIGH PROSTATE SPECIFIC ANTIGEN (PSA): Status: RESOLVED | Noted: 2022-08-12 | Resolved: 2024-08-24

## 2024-08-24 PROBLEM — R10.13 EPIGASTRIC PAIN: Status: RESOLVED | Noted: 2019-06-13 | Resolved: 2024-08-24

## 2024-08-24 PROBLEM — M25.461 EFFUSION OF RIGHT KNEE: Status: RESOLVED | Noted: 2023-11-21 | Resolved: 2024-08-24

## 2024-08-24 PROBLEM — R07.89 CHEST DISCOMFORT: Status: RESOLVED | Noted: 2023-11-21 | Resolved: 2024-08-24

## 2024-08-24 PROBLEM — L01.00 IMPETIGO: Status: RESOLVED | Noted: 2018-10-15 | Resolved: 2024-08-24

## 2024-08-24 PROBLEM — M25.561 ACUTE PAIN OF RIGHT KNEE: Status: RESOLVED | Noted: 2022-02-04 | Resolved: 2024-08-24

## 2024-08-24 PROBLEM — S91.309A OPEN WOUND OF FOOT: Status: RESOLVED | Noted: 2024-06-11 | Resolved: 2024-08-24

## 2024-08-24 PROBLEM — L08.89 OTHER SPECIFIED LOCAL INFECTIONS OF THE SKIN AND SUBCUTANEOUS TISSUE: Status: RESOLVED | Noted: 2021-02-17 | Resolved: 2024-08-24

## 2024-09-05 ENCOUNTER — APPOINTMENT (OUTPATIENT)
Dept: PRIMARY CARE | Facility: CLINIC | Age: 64
End: 2024-09-05
Payer: MEDICARE

## 2024-09-05 DIAGNOSIS — E11.9 TYPE 2 DIABETES MELLITUS WITHOUT COMPLICATION, WITHOUT LONG-TERM CURRENT USE OF INSULIN (MULTI): Primary | ICD-10-CM

## 2024-09-05 NOTE — PROGRESS NOTES
DM FOLLOW UP  E11.9    Martir Johnson presents to the office for his DM review. Referring Provider: Zara Castellanos MD     HPI  Pt recent A1c increased to 10.4%.  Pt states that this is the highest his sugars have ever been.  He reports that he was without insurance in May/June/July and could not afford his Farxiga.  He recently obtained Part D insurance and restarted Farxiga rx.        CURRENT DM PHARMACOTHERAPY   Farxiga 10mg daily  Glipizide 10mg daily     Pt denies SE/intolerances.  Reviewed all medications by prescribing practitioner (such as prescriptions, OTCs, herbal therapies and supplements) and documented in the medical record.         Primary/Secondary Prevention   - Statin? Yes  - ACE-I/ARB? No  - Aspirin? No    Summary of CGM Findings:  Patient was prev prescribed Freestyle Heath but states that he did not use the sensors.  He reports that the reader/sensor was too complex.  Declines new CGM today.      Last Labs/Vitals/Meds  Hemoglobin A1C (%)   Date Value   08/14/2024 10.4 (H)   02/08/2024 8.9 (H)   11/17/2023 8.1 (H)     Glucose (mg/dL)   Date Value   08/14/2024 178 (H)     Creatinine (mg/dL)   Date Value   08/14/2024 0.90     eGFR (mL/min/1.73m*2)   Date Value   08/14/2024 >90       BP Readings from Last 6 Encounters:   08/16/24 104/62   04/16/24 125/70   03/11/24 96/53   03/03/24 128/68   02/09/24 116/72   12/19/23 115/70        Wt Readings from Last 6 Encounters:   08/16/24 83 kg (183 lb)   04/16/24 84.8 kg (187 lb)   03/11/24 85.6 kg (188 lb 11.4 oz)   03/26/24 86.2 kg (190 lb)   03/03/24 86.2 kg (190 lb)   03/11/24 86.2 kg (190 lb)       Current Outpatient Medications on File Prior to Visit   Medication Sig Dispense Refill    aspirin 81 mg EC tablet Take 1 tablet (81 mg) by mouth twice a day.      atorvastatin (Lipitor) 20 mg tablet Take 1 tablet (20 mg) by mouth once daily. 90 tablet 3    baclofen (Lioresal) 10 mg tablet TAKE ONE-HALF TO 1 TABLET BY MOUTH EVERY 8 HOURS AS NEEDED AS INSTRUCTED    "   dapagliflozin propanediol (Farxiga) 10 mg Take 1 tablet (10 mg) by mouth once daily. 90 tablet 2    ezetimibe (Zetia) 10 mg tablet Take 1 tablet (10 mg) by mouth once daily at bedtime. 90 tablet 2    FreeStyle Heath reader (FreeStyle Heath 2 Demarest) misc Use as instructed 1 each 0    FreeStyle Heath sensor system (FreeStyle Heath 2 Sensor) kit Use as instructed every 14 days 2 each 5    gabapentin (Neurontin) 100 mg capsule Take 1 capsule (100 mg) by mouth once daily in the morning. Take before meals.      gabapentin (Neurontin) 400 mg capsule Take 300 mg by mouth once daily at bedtime.      glipiZIDE (Glucotrol) 10 mg tablet TAKE ONE TABLET BY MOUTH EVERY DAY 90 tablet 0    methocarbamol (Robaxin) 500 mg tablet TAKE ONE TABLET BY MOUTH THREE TIMES A DAY 90 tablet 0    metoprolol succinate XL (Toprol-XL) 25 mg 24 hr tablet Take 1 tablet (25 mg) by mouth 2 times a day. Do not crush or chew. 180 tablet 3    nitroglycerin (Nitrostat) 0.4 mg SL tablet Place 1 tablet (0.4 mg) under the tongue every 5 minutes if needed for chest pain. place 1 tablet under tongue every 5 minutes for up to 3 doses as needed for chest pain. Call 911 if pain persists       No current facility-administered medications on file prior to visit.       Lifestyle:  Current diet: in general, an \"unhealthy\" diet  Current exercise: no regular exercise, waiting for surgery to rotator cuff    Assessment/Plan:   Discussed addition of GLP-1 for better glycemic control.  Pt is hesitant to use injection.  Can consider this option in future.    Discussed meeting again with me in 2 months in order to check A1c.  Pt is agreeable.  Can consider addition of GLP-1 or DPP4 at next OV if A1c has not decreased.      Plan:  Continue Farxiga and glipizide at this time  Follow up with Sofie in 2 months.     Continue all meds under the continuation of care with the referring provider and clinical pharmacy team.    Data reviewed and evaluated by COLETTE Kevin [facility], " [facility]  Treatment and plan changes discussed with Zara Castellanos MD

## 2024-09-21 ENCOUNTER — HOSPITAL ENCOUNTER (OUTPATIENT)
Dept: RADIOLOGY | Facility: HOSPITAL | Age: 64
Discharge: HOME | End: 2024-09-21
Payer: COMMERCIAL

## 2024-09-21 DIAGNOSIS — M46.06 SPINAL ENTHESOPATHY, LUMBAR REGION: ICD-10-CM

## 2024-09-21 DIAGNOSIS — M54.16 RADICULOPATHY, LUMBAR REGION: ICD-10-CM

## 2024-09-21 DIAGNOSIS — M51.26 OTHER INTERVERTEBRAL DISC DISPLACEMENT, LUMBAR REGION: ICD-10-CM

## 2024-09-21 PROCEDURE — 72148 MRI LUMBAR SPINE W/O DYE: CPT | Performed by: RADIOLOGY

## 2024-09-21 PROCEDURE — 72148 MRI LUMBAR SPINE W/O DYE: CPT

## 2024-10-25 ENCOUNTER — LAB (OUTPATIENT)
Dept: LAB | Facility: LAB | Age: 64
End: 2024-10-25
Payer: COMMERCIAL

## 2024-10-25 DIAGNOSIS — E11.9 TYPE 2 DIABETES MELLITUS WITHOUT COMPLICATION, WITHOUT LONG-TERM CURRENT USE OF INSULIN (MULTI): ICD-10-CM

## 2024-10-25 DIAGNOSIS — E78.5 HYPERLIPIDEMIA, UNSPECIFIED HYPERLIPIDEMIA TYPE: ICD-10-CM

## 2024-10-25 DIAGNOSIS — Z12.5 SCREENING FOR PROSTATE CANCER: ICD-10-CM

## 2024-10-25 DIAGNOSIS — Z79.899 MEDICATION MANAGEMENT: ICD-10-CM

## 2024-10-25 LAB
ALBUMIN SERPL BCP-MCNC: 4.2 G/DL (ref 3.4–5)
ALP SERPL-CCNC: 47 U/L (ref 33–136)
ALT SERPL W P-5'-P-CCNC: 19 U/L (ref 10–52)
ANION GAP SERPL CALCULATED.3IONS-SCNC: 9 MMOL/L (ref 10–20)
AST SERPL W P-5'-P-CCNC: 18 U/L (ref 9–39)
BASOPHILS # BLD AUTO: 0.05 X10*3/UL (ref 0–0.1)
BASOPHILS NFR BLD AUTO: 0.8 %
BILIRUB SERPL-MCNC: 0.9 MG/DL (ref 0–1.2)
BUN SERPL-MCNC: 11 MG/DL (ref 6–23)
CALCIUM SERPL-MCNC: 9 MG/DL (ref 8.6–10.3)
CHLORIDE SERPL-SCNC: 105 MMOL/L (ref 98–107)
CHOLEST SERPL-MCNC: 137 MG/DL (ref 0–199)
CHOLEST/HDLC SERPL: 3.3 {RATIO}
CO2 SERPL-SCNC: 28 MMOL/L (ref 21–32)
CREAT SERPL-MCNC: 0.78 MG/DL (ref 0.5–1.3)
EGFRCR SERPLBLD CKD-EPI 2021: >90 ML/MIN/1.73M*2
EOSINOPHIL # BLD AUTO: 0.18 X10*3/UL (ref 0–0.7)
EOSINOPHIL NFR BLD AUTO: 3 %
ERYTHROCYTE [DISTWIDTH] IN BLOOD BY AUTOMATED COUNT: 12.2 % (ref 11.5–14.5)
EST. AVERAGE GLUCOSE BLD GHB EST-MCNC: 237 MG/DL
GLUCOSE SERPL-MCNC: 206 MG/DL (ref 74–99)
HBA1C MFR BLD: 9.9 %
HCT VFR BLD AUTO: 43.4 % (ref 41–52)
HDLC SERPL-MCNC: 41.4 MG/DL
HGB BLD-MCNC: 14.6 G/DL (ref 13.5–17.5)
IMM GRANULOCYTES # BLD AUTO: 0.03 X10*3/UL (ref 0–0.7)
IMM GRANULOCYTES NFR BLD AUTO: 0.5 % (ref 0–0.9)
LDLC SERPL CALC-MCNC: 61 MG/DL
LYMPHOCYTES # BLD AUTO: 1.63 X10*3/UL (ref 1.2–4.8)
LYMPHOCYTES NFR BLD AUTO: 27 %
MCH RBC QN AUTO: 32.9 PG (ref 26–34)
MCHC RBC AUTO-ENTMCNC: 33.6 G/DL (ref 32–36)
MCV RBC AUTO: 98 FL (ref 80–100)
MONOCYTES # BLD AUTO: 0.6 X10*3/UL (ref 0.1–1)
MONOCYTES NFR BLD AUTO: 10 %
NEUTROPHILS # BLD AUTO: 3.54 X10*3/UL (ref 1.2–7.7)
NEUTROPHILS NFR BLD AUTO: 58.7 %
NON HDL CHOLESTEROL: 96 MG/DL (ref 0–149)
NRBC BLD-RTO: 0 /100 WBCS (ref 0–0)
PLATELET # BLD AUTO: 199 X10*3/UL (ref 150–450)
POTASSIUM SERPL-SCNC: 3.9 MMOL/L (ref 3.5–5.3)
PROT SERPL-MCNC: 6.7 G/DL (ref 6.4–8.2)
PSA SERPL-MCNC: 0.65 NG/ML
RBC # BLD AUTO: 4.44 X10*6/UL (ref 4.5–5.9)
SODIUM SERPL-SCNC: 138 MMOL/L (ref 136–145)
TRIGL SERPL-MCNC: 174 MG/DL (ref 0–149)
VLDL: 35 MG/DL (ref 0–40)
WBC # BLD AUTO: 6 X10*3/UL (ref 4.4–11.3)

## 2024-10-25 PROCEDURE — 80053 COMPREHEN METABOLIC PANEL: CPT

## 2024-10-25 PROCEDURE — 85025 COMPLETE CBC W/AUTO DIFF WBC: CPT

## 2024-10-25 PROCEDURE — 80061 LIPID PANEL: CPT

## 2024-10-25 PROCEDURE — 84153 ASSAY OF PSA TOTAL: CPT

## 2024-10-25 PROCEDURE — 83036 HEMOGLOBIN GLYCOSYLATED A1C: CPT

## 2024-10-28 ENCOUNTER — TELEPHONE (OUTPATIENT)
Dept: PRIMARY CARE | Facility: CLINIC | Age: 64
End: 2024-10-28
Payer: COMMERCIAL

## 2024-10-28 DIAGNOSIS — E11.65 TYPE 2 DIABETES MELLITUS WITH HYPERGLYCEMIA, WITHOUT LONG-TERM CURRENT USE OF INSULIN: ICD-10-CM

## 2024-10-28 PROCEDURE — RXMED WILLOW AMBULATORY MEDICATION CHARGE

## 2024-10-28 RX ORDER — DAPAGLIFLOZIN 10 MG/1
10 TABLET, FILM COATED ORAL DAILY
Qty: 90 TABLET | Refills: 2 | Status: SHIPPED | OUTPATIENT
Start: 2024-10-28

## 2024-10-30 ENCOUNTER — PHARMACY VISIT (OUTPATIENT)
Dept: PHARMACY | Facility: CLINIC | Age: 64
End: 2024-10-30
Payer: COMMERCIAL

## 2024-11-05 ENCOUNTER — APPOINTMENT (OUTPATIENT)
Dept: PRIMARY CARE | Facility: CLINIC | Age: 64
End: 2024-11-05
Payer: MEDICARE

## 2024-11-05 DIAGNOSIS — E11.9 TYPE 2 DIABETES MELLITUS WITHOUT COMPLICATION, WITHOUT LONG-TERM CURRENT USE OF INSULIN (MULTI): Primary | ICD-10-CM

## 2024-11-05 PROCEDURE — RXMED WILLOW AMBULATORY MEDICATION CHARGE

## 2024-11-05 RX ORDER — TIRZEPATIDE 2.5 MG/.5ML
2.5 INJECTION, SOLUTION SUBCUTANEOUS
Qty: 2 ML | Refills: 0 | Status: SHIPPED | OUTPATIENT
Start: 2024-11-05 | End: 2024-12-05

## 2024-11-05 NOTE — PROGRESS NOTES
DM FOLLOW UP  E11.9    Martir Johnson presents to the office for his DM review. Referring Provider: Zara Castellanos MD     HPI  Pt here for DM follow up.  He has a hx of poorly controlled T2DM, last A1c 9.9%.  He reports that he has been under a lot of stress trying to find a particular car part and dealing with finances.        CURRENT DM PHARMACOTHERAPY   Farxiga 10mg daily   Glipizide 10mg daily     Pt denies SE/intolerances.  Reviewed all medications by prescribing practitioner (such as prescriptions, OTCs, herbal therapies and supplements) and documented in the medical record.         Primary/Secondary Prevention   - Statin? Yes  - ACE-I/ARB? No  - Aspirin? Yes    Summary of CGM Findings:  Patient is to be using Freestyle Heath 2 reader/sensors but states that he just has not taken the time to wear this lately.  Suggest restart CGM.    Last Labs/Vitals/Meds  Hemoglobin A1C (%)   Date Value   10/25/2024 9.9 (H)   08/14/2024 10.4 (H)   02/08/2024 8.9 (H)     Glucose (mg/dL)   Date Value   10/25/2024 206 (H)     Creatinine (mg/dL)   Date Value   10/25/2024 0.78     eGFR (mL/min/1.73m*2)   Date Value   10/25/2024 >90       BP Readings from Last 6 Encounters:   08/16/24 104/62   04/16/24 125/70   03/11/24 96/53   03/03/24 128/68   02/09/24 116/72   12/19/23 115/70        Wt Readings from Last 6 Encounters:   09/21/24 80.7 kg (178 lb)   08/16/24 83 kg (183 lb)   04/16/24 84.8 kg (187 lb)   03/11/24 85.6 kg (188 lb 11.4 oz)   03/26/24 86.2 kg (190 lb)   03/03/24 86.2 kg (190 lb)       Current Outpatient Medications on File Prior to Visit   Medication Sig Dispense Refill    aspirin 81 mg EC tablet Take 1 tablet (81 mg) by mouth twice a day.      atorvastatin (Lipitor) 20 mg tablet Take 1 tablet (20 mg) by mouth once daily. 90 tablet 3    baclofen (Lioresal) 10 mg tablet TAKE ONE-HALF TO 1 TABLET BY MOUTH EVERY 8 HOURS AS NEEDED AS INSTRUCTED      dapagliflozin propanediol (Farxiga) 10 mg Take 1 tablet (10 mg) by mouth  "once daily. 90 tablet 2    ezetimibe (Zetia) 10 mg tablet Take 1 tablet (10 mg) by mouth once daily at bedtime. 90 tablet 2    FreeStyle Heath reader (FreeStyle Heath 2 [place]) misc Use as instructed 1 each 0    FreeStyle Heath sensor system (FreeStyle Heath 2 Sensor) kit Use as instructed every 14 days 2 each 5    gabapentin (Neurontin) 100 mg capsule Take 1 capsule (100 mg) by mouth once daily in the morning. Take before meals.      gabapentin (Neurontin) 400 mg capsule Take 300 mg by mouth once daily at bedtime.      glipiZIDE (Glucotrol) 10 mg tablet TAKE ONE TABLET BY MOUTH EVERY DAY 90 tablet 0    methocarbamol (Robaxin) 500 mg tablet TAKE ONE TABLET BY MOUTH THREE TIMES A DAY 90 tablet 0    metoprolol succinate XL (Toprol-XL) 25 mg 24 hr tablet Take 1 tablet (25 mg) by mouth 2 times a day. Do not crush or chew. 180 tablet 3    nitroglycerin (Nitrostat) 0.4 mg SL tablet Place 1 tablet (0.4 mg) under the tongue every 5 minutes if needed for chest pain. place 1 tablet under tongue every 5 minutes for up to 3 doses as needed for chest pain. Call 911 if pain persists       No current facility-administered medications on file prior to visit.       Lifestyle:  Current diet: in general, an \"unhealthy\" diet  Current exercise: no regular exercise    Assessment/Plan:   Suggest start GLP-1 for better glycemic control.  Pt is hesitant about injection but agrees to give this a try.  Test claim shows price is $4.60/month.  Free trial voucher provided.   2.   Mounjaro Education:   Counseled patient on Mounjaro MOA, expectations, side effects, duration of therapy, administration, and monitoring parameters.  Provided detailed dosing and administration counseling to ensure proper technique.   Reviewed Mounjaro titration schedule, starting with 2.5 mg once weekly to a goal of 15 mg once weekly if tolerated  Counseled patient on the benefits of GLP-1ra glycemic control and weight loss  Reviewed storage requirements of Mounjaro " when not in use, and when to administer the medication if a dose is missed.  Advised patient that they may experience improved satiety after meals and portion sizes of meals may be reduced as doses of Mounjaro increase.   3.  Suggest continue all other medications      Plan:   START Mounjaro 2.5mg once weekly on the same day of the week  Continue Farxiga and Glipizide  Follow up with Sofie in one month  Continue all meds under the continuation of care with the referring provider and clinical pharmacy team.    Data reviewed and evaluated by COLETTE Kevin RPh, [facility]  Treatment and plan changes discussed with Zara Castellanos MD

## 2024-11-05 NOTE — PATIENT INSTRUCTIONS
START Mounjaro 2.5mg once weekly on the same day of the week  Continue Farxiga and Glipizide  Follow up with Sofie in one month

## 2024-11-07 ENCOUNTER — PHARMACY VISIT (OUTPATIENT)
Dept: PHARMACY | Facility: CLINIC | Age: 64
End: 2024-11-07
Payer: COMMERCIAL

## 2024-11-18 ENCOUNTER — APPOINTMENT (OUTPATIENT)
Dept: PRIMARY CARE | Facility: CLINIC | Age: 64
End: 2024-11-18
Payer: MEDICARE

## 2024-11-25 ENCOUNTER — OFFICE VISIT (OUTPATIENT)
Dept: ORTHOPEDIC SURGERY | Facility: HOSPITAL | Age: 64
End: 2024-11-25
Payer: MEDICARE

## 2024-11-25 DIAGNOSIS — M54.12 CERVICAL RADICULITIS: ICD-10-CM

## 2024-11-25 DIAGNOSIS — M75.102 TEAR OF LEFT ROTATOR CUFF, UNSPECIFIED TEAR EXTENT, UNSPECIFIED WHETHER TRAUMATIC: ICD-10-CM

## 2024-11-25 PROCEDURE — 3046F HEMOGLOBIN A1C LEVEL >9.0%: CPT | Performed by: ORTHOPAEDIC SURGERY

## 2024-11-25 PROCEDURE — 1123F ACP DISCUSS/DSCN MKR DOCD: CPT | Performed by: ORTHOPAEDIC SURGERY

## 2024-11-25 PROCEDURE — 99214 OFFICE O/P EST MOD 30 MIN: CPT | Performed by: ORTHOPAEDIC SURGERY

## 2024-11-25 PROCEDURE — 3048F LDL-C <100 MG/DL: CPT | Performed by: ORTHOPAEDIC SURGERY

## 2024-11-25 NOTE — PROGRESS NOTES
Returning for reevaluation of the left shoulder he continues to have left shoulder pain neck pain and numbness and tingling in his left hand including primarily the thumb.  MRI scan obtained last year demonstrated a massive rotator cuff tear.  His symptoms are getting worse.    On exam today the patient has good cervical range of motion but rotation to the left is painful.  He has fairly good range of motion of his left shoulder can elevate to 170 degrees motor power is compromised in external rotation and abduction 4/5.  Light touch sensation is slightly decreased in left thumb and index finger.    cervical radiculitis and left rotator cuff tear    Treatment plan: I think the patient should have another evaluation by cervical spine to determine whether or not he needs an MRI scan of his cervical spine.  We will help him arrange that as soon as possible.  In addition he should have a repeat MRI scan of his left shoulder to determine whether the cuff tear is repairable or not.  We will hold off on scheduling this until he sees a spine provider and if they wish to obtain cervical MRI he could probably get them done at the same setting.    This was dictated using voice recognition software and not corrected for grammatical or spelling errors.

## 2024-12-02 ENCOUNTER — TELEPHONE (OUTPATIENT)
Dept: PRIMARY CARE | Facility: CLINIC | Age: 64
End: 2024-12-02
Payer: COMMERCIAL

## 2024-12-02 DIAGNOSIS — E11.65 TYPE 2 DIABETES MELLITUS WITH HYPERGLYCEMIA, WITHOUT LONG-TERM CURRENT USE OF INSULIN: Primary | ICD-10-CM

## 2024-12-02 PROCEDURE — RXMED WILLOW AMBULATORY MEDICATION CHARGE

## 2024-12-02 RX ORDER — TIRZEPATIDE 5 MG/.5ML
5 INJECTION, SOLUTION SUBCUTANEOUS
Qty: 2 ML | Refills: 0 | Status: SHIPPED | OUTPATIENT
Start: 2024-12-02 | End: 2025-01-01

## 2024-12-02 NOTE — PROGRESS NOTES
Spoke with pt re current dose, Mounjaro.  Pt states he is not feeling full with Mounjaro 2.5mg.  Denies SE.  Suggest we increase dose from 2.5mg weekly to now 5mg weekly.  Pt is agreeable.      Sofie Kevin  Clinical Pharmacist  [facility]  [phone]

## 2024-12-02 NOTE — TELEPHONE ENCOUNTER
Patient is calling in because he states you get his Mounjaro 2.5 mg for him and it needs to go to [facility].

## 2024-12-02 NOTE — TELEPHONE ENCOUNTER
Spoke with patient.  Will send new rx for 5mg dose.   COLETTE Kevin [facility], [facility]

## 2024-12-04 ENCOUNTER — PHARMACY VISIT (OUTPATIENT)
Dept: PHARMACY | Facility: CLINIC | Age: 64
End: 2024-12-04
Payer: COMMERCIAL

## 2024-12-11 ENCOUNTER — APPOINTMENT (OUTPATIENT)
Dept: PRIMARY CARE | Facility: CLINIC | Age: 64
End: 2024-12-11
Payer: COMMERCIAL

## 2024-12-19 ENCOUNTER — APPOINTMENT (OUTPATIENT)
Dept: PRIMARY CARE | Facility: CLINIC | Age: 64
End: 2024-12-19
Payer: MEDICARE

## 2024-12-23 ENCOUNTER — APPOINTMENT (OUTPATIENT)
Dept: CARDIOLOGY | Facility: CLINIC | Age: 64
End: 2024-12-23
Payer: MEDICARE

## 2024-12-23 DIAGNOSIS — I25.10 ARTERIOSCLEROSIS OF CORONARY ARTERY: Primary | ICD-10-CM

## 2024-12-27 DIAGNOSIS — E78.5 HYPERLIPIDEMIA, UNSPECIFIED HYPERLIPIDEMIA TYPE: ICD-10-CM

## 2024-12-27 RX ORDER — ATORVASTATIN CALCIUM 20 MG/1
20 TABLET, FILM COATED ORAL DAILY
Qty: 90 TABLET | Refills: 3 | Status: SHIPPED | OUTPATIENT
Start: 2024-12-27

## 2025-01-03 ENCOUNTER — HOSPITAL ENCOUNTER (OUTPATIENT)
Dept: RADIOLOGY | Facility: HOSPITAL | Age: 65
Discharge: HOME | End: 2025-01-03
Payer: MEDICARE

## 2025-01-03 DIAGNOSIS — M75.102 TEAR OF LEFT ROTATOR CUFF, UNSPECIFIED TEAR EXTENT, UNSPECIFIED WHETHER TRAUMATIC: ICD-10-CM

## 2025-01-03 PROCEDURE — 73221 MRI JOINT UPR EXTREM W/O DYE: CPT | Mod: LT

## 2025-01-07 ENCOUNTER — TELEPHONE (OUTPATIENT)
Dept: PRIMARY CARE | Facility: CLINIC | Age: 65
End: 2025-01-07
Payer: MEDICARE

## 2025-01-07 DIAGNOSIS — E11.65 TYPE 2 DIABETES MELLITUS WITH HYPERGLYCEMIA, WITHOUT LONG-TERM CURRENT USE OF INSULIN: ICD-10-CM

## 2025-01-07 RX ORDER — TIRZEPATIDE 5 MG/.5ML
5 INJECTION, SOLUTION SUBCUTANEOUS
Qty: 2 ML | Refills: 0 | Status: SHIPPED | OUTPATIENT
Start: 2025-01-07 | End: 2025-02-04

## 2025-01-07 NOTE — TELEPHONE ENCOUNTER
Mounjaro is too much money right now, has to meet a deductible. Is there something else ? Please advise

## 2025-01-07 NOTE — TELEPHONE ENCOUNTER
Rx Refill Request Telephone Encounter    Name:  Martir Johnsno  :  [id number]  Medication Name:  Mounjaro - humble Is out, due to take 1-10-25            Specific Pharmacy location:   Pharmacy  Date of next appointment:    Best number to reach patient:

## 2025-01-09 ENCOUNTER — OFFICE VISIT (OUTPATIENT)
Dept: ORTHOPEDIC SURGERY | Facility: HOSPITAL | Age: 65
End: 2025-01-09
Payer: COMMERCIAL

## 2025-01-09 ENCOUNTER — HOSPITAL ENCOUNTER (OUTPATIENT)
Dept: RADIOLOGY | Facility: HOSPITAL | Age: 65
Discharge: HOME | End: 2025-01-09
Payer: COMMERCIAL

## 2025-01-09 DIAGNOSIS — M54.12 CERVICAL RADICULITIS: ICD-10-CM

## 2025-01-09 DIAGNOSIS — M54.12 CERVICAL RADICULITIS: Primary | ICD-10-CM

## 2025-01-09 DIAGNOSIS — G56.02 CARPAL TUNNEL SYNDROME OF LEFT WRIST: ICD-10-CM

## 2025-01-09 DIAGNOSIS — M75.102 TEAR OF LEFT ROTATOR CUFF, UNSPECIFIED TEAR EXTENT, UNSPECIFIED WHETHER TRAUMATIC: ICD-10-CM

## 2025-01-09 PROCEDURE — 72050 X-RAY EXAM NECK SPINE 4/5VWS: CPT

## 2025-01-09 PROCEDURE — 1036F TOBACCO NON-USER: CPT | Performed by: ORTHOPAEDIC SURGERY

## 2025-01-09 PROCEDURE — 1125F AMNT PAIN NOTED PAIN PRSNT: CPT | Performed by: ORTHOPAEDIC SURGERY

## 2025-01-09 PROCEDURE — 1123F ACP DISCUSS/DSCN MKR DOCD: CPT | Performed by: ORTHOPAEDIC SURGERY

## 2025-01-09 PROCEDURE — 99214 OFFICE O/P EST MOD 30 MIN: CPT | Performed by: ORTHOPAEDIC SURGERY

## 2025-01-09 PROCEDURE — 72050 X-RAY EXAM NECK SPINE 4/5VWS: CPT | Performed by: STUDENT IN AN ORGANIZED HEALTH CARE EDUCATION/TRAINING PROGRAM

## 2025-01-09 PROCEDURE — 1159F MED LIST DOCD IN RCRD: CPT | Performed by: ORTHOPAEDIC SURGERY

## 2025-01-09 ASSESSMENT — PAIN - FUNCTIONAL ASSESSMENT: PAIN_FUNCTIONAL_ASSESSMENT: 0-10

## 2025-01-09 ASSESSMENT — PAIN SCALES - GENERAL: PAINLEVEL_OUTOF10: 9

## 2025-01-09 ASSESSMENT — PAIN DESCRIPTION - DESCRIPTORS: DESCRIPTORS: PATIENT UNABLE TO DESCRIBE
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
## 2025-01-10 ENCOUNTER — APPOINTMENT (OUTPATIENT)
Dept: PRIMARY CARE | Facility: CLINIC | Age: 65
End: 2025-01-10
Payer: MEDICARE

## 2025-01-10 ENCOUNTER — TELEPHONE (OUTPATIENT)
Dept: PRIMARY CARE | Facility: CLINIC | Age: 65
End: 2025-01-10

## 2025-01-10 VITALS
BODY MASS INDEX: 26.07 KG/M2 | HEIGHT: 68 IN | SYSTOLIC BLOOD PRESSURE: 102 MMHG | DIASTOLIC BLOOD PRESSURE: 70 MMHG | HEART RATE: 84 BPM | WEIGHT: 172 LBS

## 2025-01-10 DIAGNOSIS — Z00.00 ROUTINE GENERAL MEDICAL EXAMINATION AT HEALTH CARE FACILITY: Primary | ICD-10-CM

## 2025-01-10 DIAGNOSIS — E78.5 HYPERLIPIDEMIA, UNSPECIFIED HYPERLIPIDEMIA TYPE: ICD-10-CM

## 2025-01-10 DIAGNOSIS — E11.65 TYPE 2 DIABETES MELLITUS WITH HYPERGLYCEMIA, WITHOUT LONG-TERM CURRENT USE OF INSULIN: ICD-10-CM

## 2025-01-10 DIAGNOSIS — Z13.6 SCREENING FOR CARDIOVASCULAR CONDITION: ICD-10-CM

## 2025-01-10 DIAGNOSIS — E11.9 TYPE 2 DIABETES MELLITUS WITHOUT COMPLICATION, WITHOUT LONG-TERM CURRENT USE OF INSULIN (MULTI): ICD-10-CM

## 2025-01-10 PROBLEM — E11.42 TYPE 2 DIABETES MELLITUS WITH DIABETIC POLYNEUROPATHY: Status: ACTIVE | Noted: 2025-01-10

## 2025-01-10 PROCEDURE — 1170F FXNL STATUS ASSESSED: CPT | Performed by: STUDENT IN AN ORGANIZED HEALTH CARE EDUCATION/TRAINING PROGRAM

## 2025-01-10 PROCEDURE — G0009 ADMIN PNEUMOCOCCAL VACCINE: HCPCS | Performed by: STUDENT IN AN ORGANIZED HEALTH CARE EDUCATION/TRAINING PROGRAM

## 2025-01-10 PROCEDURE — G0008 ADMIN INFLUENZA VIRUS VAC: HCPCS | Performed by: STUDENT IN AN ORGANIZED HEALTH CARE EDUCATION/TRAINING PROGRAM

## 2025-01-10 PROCEDURE — G0403 EKG FOR INITIAL PREVENT EXAM: HCPCS | Performed by: STUDENT IN AN ORGANIZED HEALTH CARE EDUCATION/TRAINING PROGRAM

## 2025-01-10 PROCEDURE — G0402 INITIAL PREVENTIVE EXAM: HCPCS | Performed by: STUDENT IN AN ORGANIZED HEALTH CARE EDUCATION/TRAINING PROGRAM

## 2025-01-10 PROCEDURE — 90656 IIV3 VACC NO PRSV 0.5 ML IM: CPT | Performed by: STUDENT IN AN ORGANIZED HEALTH CARE EDUCATION/TRAINING PROGRAM

## 2025-01-10 PROCEDURE — 99214 OFFICE O/P EST MOD 30 MIN: CPT | Performed by: STUDENT IN AN ORGANIZED HEALTH CARE EDUCATION/TRAINING PROGRAM

## 2025-01-10 PROCEDURE — 3008F BODY MASS INDEX DOCD: CPT | Performed by: STUDENT IN AN ORGANIZED HEALTH CARE EDUCATION/TRAINING PROGRAM

## 2025-01-10 PROCEDURE — 3074F SYST BP LT 130 MM HG: CPT | Performed by: STUDENT IN AN ORGANIZED HEALTH CARE EDUCATION/TRAINING PROGRAM

## 2025-01-10 PROCEDURE — 90677 PCV20 VACCINE IM: CPT | Performed by: STUDENT IN AN ORGANIZED HEALTH CARE EDUCATION/TRAINING PROGRAM

## 2025-01-10 PROCEDURE — 1159F MED LIST DOCD IN RCRD: CPT | Performed by: STUDENT IN AN ORGANIZED HEALTH CARE EDUCATION/TRAINING PROGRAM

## 2025-01-10 PROCEDURE — 3078F DIAST BP <80 MM HG: CPT | Performed by: STUDENT IN AN ORGANIZED HEALTH CARE EDUCATION/TRAINING PROGRAM

## 2025-01-10 PROCEDURE — 1123F ACP DISCUSS/DSCN MKR DOCD: CPT | Performed by: STUDENT IN AN ORGANIZED HEALTH CARE EDUCATION/TRAINING PROGRAM

## 2025-01-10 RX ORDER — GLIPIZIDE 10 MG/1
10 TABLET ORAL DAILY
Qty: 90 TABLET | Refills: 2 | Status: SHIPPED | OUTPATIENT
Start: 2025-01-10

## 2025-01-10 RX ORDER — DAPAGLIFLOZIN 10 MG/1
10 TABLET, FILM COATED ORAL DAILY
Qty: 90 TABLET | Refills: 2 | Status: SHIPPED | OUTPATIENT
Start: 2025-01-10

## 2025-01-10 ASSESSMENT — PATIENT HEALTH QUESTIONNAIRE - PHQ9
SUM OF ALL RESPONSES TO PHQ9 QUESTIONS 1 AND 2: 0
2. FEELING DOWN, DEPRESSED OR HOPELESS: NOT AT ALL
1. LITTLE INTEREST OR PLEASURE IN DOING THINGS: NOT AT ALL
1. LITTLE INTEREST OR PLEASURE IN DOING THINGS: NOT AT ALL
2. FEELING DOWN, DEPRESSED OR HOPELESS: NOT AT ALL
SUM OF ALL RESPONSES TO PHQ9 QUESTIONS 1 AND 2: 0

## 2025-01-10 ASSESSMENT — ACTIVITIES OF DAILY LIVING (ADL)
BATHING: INDEPENDENT
MANAGING_FINANCES: INDEPENDENT
TAKING_MEDICATION: INDEPENDENT
GROCERY_SHOPPING: INDEPENDENT
DRESSING: INDEPENDENT
DOING_HOUSEWORK: INDEPENDENT

## 2025-01-10 ASSESSMENT — COLUMBIA-SUICIDE SEVERITY RATING SCALE - C-SSRS: 1. IN THE PAST MONTH, HAVE YOU WISHED YOU WERE DEAD OR WISHED YOU COULD GO TO SLEEP AND NOT WAKE UP?: NO

## 2025-01-10 NOTE — PROGRESS NOTES
"Subjective   Reason for Visit: Martir Johnson is an 64 y.o. male here for a Medicare Wellness visit.     Past Medical, Surgical, and Family History reviewed and updated in chart.    Reviewed all medications by prescribing practitioner or clinical pharmacist (such as prescriptions, OTCs, herbal therapies and supplements) and documented in the medical record.    HPI    Patient Self Assessment of Health Status  Patient Self Assessment: Good    Nutrition and Exercise  Current Diet: Well Balanced Diet  Adequate Fluid Intake: Yes  Caffeine: Yes  Exercise Frequency: Regularly    Functional Ability/Level of Safety  Cognitive Impairment Observed: No cognitive impairment observed  Cognitive Impairment Reported: No cognitive impairment reported by patient or family    Home Safety Risk Factors: None    Patient Care Team:  Zara Castellanos MD as PCP - General  Zara Castellanos MD as PCP - [employer]vantage PCP  Zara Castellanos MD as Primary Care Provider  Sofie Kevin [facility] as Pharmacist (Pharmacy)     Review of Systems    Objective   Vitals:  /70   Pulse 84   Ht 1.727 m (5' 8\")   Wt 78 kg (172 lb)   PF 98 L/min   BMI 26.15 kg/m²       Physical Exam    Assessment/Plan   Problem List Items Addressed This Visit     Type 2 diabetes mellitus   Other Visit Diagnoses     Routine general medical examination at health care facility    -  Primary    Screening for prostate cancer        Hyperlipidemia, unspecified hyperlipidemia type        Routine general medical examination at a health care facility        Screening for cardiovascular condition                   "

## 2025-01-10 NOTE — PROGRESS NOTES
Subjective   Reason for Visit: Martir Johnson is an 64 y.o. male here for a Medicare Wellness visit.     Past Medical, Surgical, and Family History reviewed and updated in chart.    Reviewed all medications by prescribing practitioner or clinical pharmacist (such as prescriptions, OTCs, herbal therapies and supplements) and documented in the medical record.      INTERVAL HX/CURRENT CONCERNS:  Issues with Mounjaro - went to  [facility] and was told >$500 at [facility]. Due to high deductible. Will be changing to [employer] as of 2/2025     CHRONIC CONDITIONS:  T2DM  CAD- under cardiology Dr. Dunlap  HLD - atorvastatin 20 mg. LDL 61  Lumbar stenosis s/p L3-4, L4-5 laminectomy    #T2DM review  Current medication regimen:   -Farxiga 10 mg  -Glipizide 10 mg   -Mounjaro 5 mg --- not currently taking d/t cost   --- no longer taking, says told to stop by pharmacist  - stopped 12/2023 d/t GI side effects  Tolerating current medication regimen yes  CGM? No - was scheduled to set up but did not make appt  Hypoglycemic episodes no  Last A1C : 9.9, 10.4, 8.9  Last eGFR ->90  Last Creatinine - 0.9  Last microalbumin - ordered, not done with labs  Denies any change in diet. Not checking blood sugars. Never followed up to get CGM.     Health Maintenance  Immunizations:     Tdap 3/2024    Pneumococcal - PCV20 today    Shingles - discussed updated vaccine    COVID - discussed updated vaccine    Influenza - today  Colonoscopy: 4/2024  Lung cancer screening: Never smoker     Male Specific   PSA: 0.65   AAA screen:  NA      Patient Self Assessment of Health Status  Patient Self Assessment: Good    Nutrition and Exercise  Current Diet: Well Balanced Diet  Adequate Fluid Intake: Yes  Caffeine: Yes  Exercise Frequency: Regularly    Functional Ability/Level of Safety  Cognitive Impairment Observed: No cognitive impairment observed  Cognitive Impairment Reported: No cognitive impairment reported by patient or family    Home Safety Risk  "Factors: None    Patient Care Team:  Zara Castellanos MD as PCP - General  Zara Castellanos MD as PCP - [employer]vantage PCP  Zara Castellanos MD as Primary Care Provider  Sofie Kevin [facility] as Pharmacist (Pharmacy)     Review of Systems   Constitutional:  Negative for chills and fever.   HENT:  Negative for trouble swallowing.    Eyes:  Negative for visual disturbance.   Respiratory:  Negative for cough, shortness of breath and wheezing.    Cardiovascular:  Negative for chest pain and palpitations.   Gastrointestinal:  Negative for abdominal pain, blood in stool, constipation and diarrhea.   Genitourinary:  Negative for difficulty urinating, penile pain and testicular pain.   Musculoskeletal:  Positive for back pain (chronic). Negative for arthralgias and joint swelling.   Skin:  Negative for rash.   Neurological:  Negative for dizziness, light-headedness and headaches.   Psychiatric/Behavioral:  Negative for dysphoric mood. The patient is not nervous/anxious.        Objective   Vitals:  /70   Pulse 84   Ht 1.727 m (5' 8\")   Wt 78 kg (172 lb)   PF 98 L/min   BMI 26.15 kg/m²       Physical Exam  Vitals and nursing note reviewed.   Constitutional:       Appearance: Normal appearance.   HENT:      Head: Normocephalic and atraumatic.      Right Ear: Tympanic membrane, ear canal and external ear normal.      Left Ear: Tympanic membrane, ear canal and external ear normal.      Mouth/Throat:      Mouth: Mucous membranes are moist.      Pharynx: Oropharynx is clear.   Eyes:      Extraocular Movements: Extraocular movements intact.      Conjunctiva/sclera: Conjunctivae normal.      Pupils: Pupils are equal, round, and reactive to light.   Cardiovascular:      Rate and Rhythm: Normal rate and regular rhythm.      Heart sounds: Normal heart sounds. No murmur heard.  Pulmonary:      Effort: Pulmonary effort is normal.      Breath sounds: Normal breath sounds. No wheezing, rhonchi or rales.   Abdominal:      General: " Abdomen is flat.      Palpations: Abdomen is soft.      Tenderness: There is no abdominal tenderness.   Musculoskeletal:         General: Normal range of motion.      Cervical back: Normal range of motion and neck supple.   Skin:     General: Skin is warm and dry.   Neurological:      Mental Status: He is alert.   Psychiatric:         Mood and Affect: Mood normal.         Behavior: Behavior normal.         Assessment/Plan   1. Routine general medical examination at health care facility (Primary)  Well adult exam.  1. Age appropriate preventative measures reviewed.   2. Encouraged healthy diet and exercise.  3. Immunizations- Reviewed and discussed  4. Labs- Reviewed and discussed with patient  5. Medications- Reviewed    - Comprehensive metabolic panel; Future  - Hemoglobin A1c; Future  - ECG 12 lead (Clinic Performed)    2. Type 2 diabetes mellitus with hyperglycemia, without long-term current use of insulin  Uncontrolled  Plan to increase Mounjaro to 7.5mg but pt currently unable to fill prescription due to cost. Will check into financial assistance program  Patient is on ASA and statin  Lipid panel reviewed  Routine diabetic retinopathy screening discussed, needs to schedule  Discussed dietary efforts and physical activity   - Comprehensive metabolic panel; Future  - Hemoglobin A1c; Future  - dapagliflozin propanediol (Farxiga) 10 mg; Take 1 tablet (10 mg) by mouth once daily.  Dispense: 90 tablet; Refill: 2  - glipiZIDE (Glucotrol) 10 mg tablet; Take 1 tablet (10 mg) by mouth once daily.  Dispense: 90 tablet; Refill: 2    3. Hyperlipidemia, unspecified hyperlipidemia type  Lipid panel reviewed. Continue medication. Recommend reduction in saturated fat and total caloric intake. Regular exercise.  - Comprehensive metabolic panel; Future    4. Screening for cardiovascular condition

## 2025-01-13 DIAGNOSIS — M54.10 RADICULITIS: ICD-10-CM

## 2025-01-14 RX ORDER — METHOCARBAMOL 500 MG/1
500 TABLET, FILM COATED ORAL 3 TIMES DAILY
Qty: 90 TABLET | Refills: 0 | Status: SHIPPED | OUTPATIENT
Start: 2025-01-14

## 2025-01-16 DIAGNOSIS — I25.10 CORONARY ARTERY DISEASE, UNSPECIFIED VESSEL OR LESION TYPE, UNSPECIFIED WHETHER ANGINA PRESENT, UNSPECIFIED WHETHER NATIVE OR TRANSPLANTED HEART: Primary | ICD-10-CM

## 2025-01-21 RX ORDER — NITROGLYCERIN 0.4 MG/1
TABLET SUBLINGUAL
Qty: 25 TABLET | Refills: 0 | Status: SHIPPED | OUTPATIENT
Start: 2025-01-21

## 2025-01-23 ASSESSMENT — ENCOUNTER SYMPTOMS
FEVER: 0
CHILLS: 0
COUGH: 0
SHORTNESS OF BREATH: 0
TROUBLE SWALLOWING: 0
ABDOMINAL PAIN: 0
BLOOD IN STOOL: 0
HEADACHES: 0
BACK PAIN: 1
ARTHRALGIAS: 0
NERVOUS/ANXIOUS: 0
DYSPHORIC MOOD: 0
PALPITATIONS: 0
LIGHT-HEADEDNESS: 0
CONSTIPATION: 0
JOINT SWELLING: 0
DIFFICULTY URINATING: 0
DIARRHEA: 0
DIZZINESS: 0
WHEEZING: 0

## 2025-01-24 DIAGNOSIS — E11.9 TYPE 2 DIABETES MELLITUS WITHOUT COMPLICATION, WITHOUT LONG-TERM CURRENT USE OF INSULIN (MULTI): Primary | ICD-10-CM

## 2025-01-27 ENCOUNTER — OFFICE VISIT (OUTPATIENT)
Dept: CARDIOLOGY | Facility: CLINIC | Age: 65
End: 2025-01-27
Payer: MEDICARE

## 2025-01-27 VITALS
BODY MASS INDEX: 26.98 KG/M2 | SYSTOLIC BLOOD PRESSURE: 128 MMHG | OXYGEN SATURATION: 99 % | WEIGHT: 178 LBS | DIASTOLIC BLOOD PRESSURE: 76 MMHG | HEART RATE: 54 BPM | HEIGHT: 68 IN

## 2025-01-27 DIAGNOSIS — I25.10 ARTERIOSCLEROSIS OF CORONARY ARTERY: Primary | ICD-10-CM

## 2025-01-27 DIAGNOSIS — E78.2 MIXED HYPERLIPIDEMIA: ICD-10-CM

## 2025-01-27 PROCEDURE — 3074F SYST BP LT 130 MM HG: CPT | Performed by: NURSE PRACTITIONER

## 2025-01-27 PROCEDURE — 1123F ACP DISCUSS/DSCN MKR DOCD: CPT | Performed by: NURSE PRACTITIONER

## 2025-01-27 PROCEDURE — 1036F TOBACCO NON-USER: CPT | Performed by: NURSE PRACTITIONER

## 2025-01-27 PROCEDURE — 1160F RVW MEDS BY RX/DR IN RCRD: CPT | Performed by: NURSE PRACTITIONER

## 2025-01-27 PROCEDURE — 99214 OFFICE O/P EST MOD 30 MIN: CPT | Performed by: NURSE PRACTITIONER

## 2025-01-27 PROCEDURE — 3008F BODY MASS INDEX DOCD: CPT | Performed by: NURSE PRACTITIONER

## 2025-01-27 PROCEDURE — 1159F MED LIST DOCD IN RCRD: CPT | Performed by: NURSE PRACTITIONER

## 2025-01-27 PROCEDURE — 3078F DIAST BP <80 MM HG: CPT | Performed by: NURSE PRACTITIONER

## 2025-01-27 ASSESSMENT — ENCOUNTER SYMPTOMS
LOSS OF SENSATION IN FEET: 1
OCCASIONAL FEELINGS OF UNSTEADINESS: 1
DEPRESSION: 0

## 2025-01-27 ASSESSMENT — PATIENT HEALTH QUESTIONNAIRE - PHQ9
1. LITTLE INTEREST OR PLEASURE IN DOING THINGS: NOT AT ALL
SUM OF ALL RESPONSES TO PHQ9 QUESTIONS 1 AND 2: 0
2. FEELING DOWN, DEPRESSED OR HOPELESS: NOT AT ALL

## 2025-01-27 ASSESSMENT — COLUMBIA-SUICIDE SEVERITY RATING SCALE - C-SSRS
1. IN THE PAST MONTH, HAVE YOU WISHED YOU WERE DEAD OR WISHED YOU COULD GO TO SLEEP AND NOT WAKE UP?: NO
2. HAVE YOU ACTUALLY HAD ANY THOUGHTS OF KILLING YOURSELF?: NO
6. HAVE YOU EVER DONE ANYTHING, STARTED TO DO ANYTHING, OR PREPARED TO DO ANYTHING TO END YOUR LIFE?: NO

## 2025-01-27 NOTE — PROGRESS NOTES
Subjective   Martir Johnson is a 64 y.o. male.    Chief Complaint:  Hyperlipidemia and Coronary Artery Disease    Mr. Johnson returns for a 9 month follow up. He is seen in collaboration with Dr. Dunlap. He has been feeling well from a cardiac standpoint. He has remained compliant with his medications, denying any intolerances. He remains active with house and yard work, denying any exertional chest pain or shortness of breath. He is however struggling with intermittent chest/shoulder burning. It is not exacerbated by activity and is usually relieved by applying ice. He is following with orthopedics, and is scheduled to have an EMG in the near future. He denies any recent ER visits or hospitalizations. He offers no specific cardiovascular complaints or concerns today. He denies any complaints of chest pain, shortness of breath, lightheadedness, dizziness, palpitations, syncope, orthopnea, paroxysmal nocturnal dyspnea, lower extremity swelling or bleeding concerns.            Review of Systems   All other systems reviewed and are negative.      Objective   Physical Exam  Constitutional:       Appearance: Healthy appearance. In no distress  Pulmonary:      Effort: Pulmonary effort is normal.      Breath sounds: Normal breath sounds.   Cardiovascular:      Normal rate. Regular rhythm. Normal S1. Normal S2.       Murmurs: There is no murmur.      Carotids: right carotid pulse +2, no bruit heard over the right carotid. left carotid pulse +2, no bruit heard over the left carotid.  Edema:     Peripheral edema absent.   Abdominal:      Palpations: Abdomen is soft.   Musculoskeletal:       Cervical back: Normal range of motion.   Skin:     General: Skin is warm and dry. Normal color and pigmentation   Neurological:      Mental Status: Alert and oriented to person, place and time.   Psychiatric:     Mood and Affect: appropriate mood and appropriate affect.       Lab Review:   Lab Results   Component Value Date      10/25/2024    K 3.9 10/25/2024     10/25/2024    CO2 28 10/25/2024    BUN 11 10/25/2024    CREATININE 0.78 10/25/2024    GLUCOSE 206 (H) 10/25/2024    CALCIUM 9.0 10/25/2024     Lab Results   Component Value Date    WBC 6.0 10/25/2024    HGB 14.6 10/25/2024    HCT 43.4 10/25/2024    MCV 98 10/25/2024     10/25/2024     Lab Results   Component Value Date    CHOL 137 10/25/2024    TRIG 174 (H) 10/25/2024    HDL 41.4 10/25/2024       Assessment/Plan   Mr. Johnson is a pleasant 64 year old  male with a past medical history significant for hyperlipidemia, T2DM and medically managed CAD by heart catheterization 11/2022 following an abnormal stress test. He also struggles with chronic back pain and spinal stenosis. Echocardiogram 11/2022 showed an EF of 50-55%. He presents today for routine follow up stable from a cardiac standpoint. His VS and EKG remain stable. He remains on appropriate antiplatelet and lipid lowering therapy with his LDL at goal. He denies any clear angina, and his shoulder burning appears to be more of an orthopedic issue. I will have him continue all medications unchanged. We will not embark on any additional cardiovascular testing at this time. He will follow up with us in clinic in 6 months. He knows to call for any concerns.

## 2025-02-03 ENCOUNTER — TELEPHONE (OUTPATIENT)
Dept: ORTHOPEDIC SURGERY | Facility: CLINIC | Age: 65
End: 2025-02-03

## 2025-02-03 ENCOUNTER — HOSPITAL ENCOUNTER (OUTPATIENT)
Dept: NEUROLOGY | Facility: CLINIC | Age: 65
Discharge: HOME | End: 2025-02-03
Payer: MEDICARE

## 2025-02-03 DIAGNOSIS — G56.22 CUBITAL TUNNEL SYNDROME ON LEFT: ICD-10-CM

## 2025-02-03 DIAGNOSIS — M54.12 CERVICAL RADICULITIS: ICD-10-CM

## 2025-02-03 DIAGNOSIS — G56.02 CARPAL TUNNEL SYNDROME OF LEFT WRIST: Primary | ICD-10-CM

## 2025-02-03 DIAGNOSIS — G56.02 CARPAL TUNNEL SYNDROME OF LEFT WRIST: ICD-10-CM

## 2025-02-03 PROCEDURE — 95913 NRV CNDJ TEST 13/> STUDIES: CPT | Performed by: STUDENT IN AN ORGANIZED HEALTH CARE EDUCATION/TRAINING PROGRAM

## 2025-02-03 PROCEDURE — 95886 MUSC TEST DONE W/N TEST COMP: CPT | Performed by: STUDENT IN AN ORGANIZED HEALTH CARE EDUCATION/TRAINING PROGRAM

## 2025-02-03 NOTE — TELEPHONE ENCOUNTER
Patient called to discuss EMG results.  He has EMG demonstrating left carpal tunnel and possible cubital tunnel syndrome.  There are some chronic changes and radiculopathy at C7 and C8 which she already had surgery.  His lumbar also showed chronic radiculopathies of L4-L5 L5-S1.    I advised patient to see one of our hand surgeons for evaluation of his carpal and cubital tunnel.  He was given phone numbers to call and schedule.  Referral was also placed

## 2025-02-04 ENCOUNTER — APPOINTMENT (OUTPATIENT)
Dept: PHARMACY | Facility: HOSPITAL | Age: 65
End: 2025-02-04
Payer: MEDICARE

## 2025-02-04 DIAGNOSIS — E11.9 TYPE 2 DIABETES MELLITUS WITHOUT COMPLICATION, WITHOUT LONG-TERM CURRENT USE OF INSULIN (MULTI): ICD-10-CM

## 2025-02-04 DIAGNOSIS — E11.65 TYPE 2 DIABETES MELLITUS WITH HYPERGLYCEMIA, WITHOUT LONG-TERM CURRENT USE OF INSULIN: ICD-10-CM

## 2025-02-04 NOTE — PROGRESS NOTES
MEDICATION MANAGEMENT    Martir Johnson is a 64 y.o. male who was referred by Zara Castellanos MD to complete medication reconciliation and review with the clinical pharmacist.  A comprehensive medication review was completed with the patient via telephone today.  With patient's permission, this is a Telemedicine visit with audio. Provider located at office address. Patient located at their home address. All issues as documented below were discussed and addressed but limited physical exam was performed. If it was felt that the patient should be evaluated via face-to-face office appointment(s) they were directed to appropriate location.  Patient reports financial barrier with current medication.      MEDICATION HISTORY  Allergies   Allergen Reactions    Codeine Hives    Iodine Rash    Metformin Diarrhea    Hydrocodone-Homatropine Hives     Current Outpatient Medications on File Prior to Visit   Medication Sig Dispense Refill    aspirin 81 mg EC tablet Take 1 tablet (81 mg) by mouth twice a day.      atorvastatin (Lipitor) 20 mg tablet TAKE ONE TABLET BY MOUTH EVERY DAY 90 tablet 3    baclofen (Lioresal) 10 mg tablet TAKE ONE-HALF TO 1 TABLET BY MOUTH EVERY 8 HOURS AS NEEDED AS INSTRUCTED      dapagliflozin propanediol (Farxiga) 10 mg Take 1 tablet (10 mg) by mouth once daily. 90 tablet 2    ezetimibe (Zetia) 10 mg tablet Take 1 tablet (10 mg) by mouth once daily at bedtime. 90 tablet 2    FreeStyle Heath reader (FreeStyle Heath 2 [place]) misc Use as instructed 1 each 0    FreeStyle Heath sensor system (FreeStyle Heath 2 Sensor) kit Use as instructed every 14 days 2 each 5    gabapentin (Neurontin) 100 mg capsule Take 1 capsule (100 mg) by mouth once daily in the morning. Take before meals.      gabapentin (Neurontin) 400 mg capsule Take 300 mg by mouth once daily at bedtime.      glipiZIDE (Glucotrol) 10 mg tablet Take 1 tablet (10 mg) by mouth once daily. 90 tablet 2    methocarbamol (Robaxin) 500 mg tablet TAKE ONE  TABLET BY MOUTH THREE TIMES A DAY 90 tablet 0    metoprolol succinate XL (Toprol-XL) 25 mg 24 hr tablet Take 1 tablet (25 mg) by mouth 2 times a day. Do not crush or chew. 180 tablet 3    nitroglycerin (Nitrostat) 0.4 mg SL tablet PLACE 1 TABLET UNDER THE TONGUE EVERY 5 MINUTES FOR UP TO 3 DOSES AS NEEDED FOR CHEST PAIN. *CALL 911 IF PAIN PERSISTS.* 25 tablet 0    tirzepatide (Mounjaro) 5 mg/0.5 mL pen injector Inject 5 mg under the skin every 7 days for 28 days. (Patient not taking: Reported on 1/27/2025) 2 mL 0     No current facility-administered medications on file prior to visit.        Patient Assistance Screening (VAF)  Patient verbally reports monthly or yearly income which is less than 400% federal poverty level.  Application for program has been submitted for the following medications:     Farxiga, Mounjaro     Patient has been informed that program team will be reaching out to them to discuss necessary documentation, instructed to answer phone/return voicemail.   Patient aware this process may take up to 6 weeks.   If approved medication must be filled through [facility] pharmacy and may be picked up or mailed to patient.       LABS  There were no vitals taken for this visit.   Lab Results   Component Value Date    HGBA1C 9.9 (H) 10/25/2024    HGBA1C 10.4 (H) 08/14/2024    HGBA1C 8.9 (H) 02/08/2024     Lab Results   Component Value Date    BILITOT 0.9 10/25/2024    CALCIUM 9.0 10/25/2024    CO2 28 10/25/2024     10/25/2024    CREATININE 0.78 10/25/2024    GLUCOSE 206 (H) 10/25/2024    ALKPHOS 47 10/25/2024    K 3.9 10/25/2024    PROT 6.7 10/25/2024     10/25/2024    AST 18 10/25/2024    ALT 19 10/25/2024    BUN 11 10/25/2024    ANIONGAP 9 (L) 10/25/2024    ALBUMIN 4.2 10/25/2024    LIPASE 42 11/29/2022    GFRMALE >90 11/29/2022     Lab Results   Component Value Date    TRIG 174 (H) 10/25/2024    CHOL 137 10/25/2024    LDLCALC 61 10/25/2024    HDL 41.4 10/25/2024         Assessment/Plan     Comments/Recommendations to PCP:  Reconciled medications with patient via telephone today.  Reviewed instructions, MOA, SE and dose for Farxiga, Mounjaro    Patient verbalizes understanding regarding plan of care and all questions answered   4.   Pt will follow up with PCP as scheduled      RAMIN Barrett    Verbal consent to manage patient's drug therapy was obtained from the patient and/or an individual authorized to act on behalf of a patient. They were informed they may decline to participate or withdraw from participation in pharmacy services at any time.

## 2025-02-04 NOTE — PROGRESS NOTES
Please review for internal [employer] Assistance [employer] (VAF) eligibility. Prescriptions have been sent to [facility].     Martir Johnson  1960   [id number]  [phone]   [email or web address]  Delivery Preference (if known): MAIL  Priority:  Hold Medication until [facility] approved/denied  Filing Status: Patient does file taxes  Household size: 2  Financial Documents To Be Collected By: Documents attached to email  Medication(s):    Farxiga, Mounjaro     *I have confirmed the above medications are listed on the current VAF formulary: [email or web address]    I acknowledge the [facility] staff will further reach out to the patient to confirm additional details as needed, including but not limited to collecting financial documentation, confirming delivery information, obtaining payment method for non-VAF medications.      Sofie Kevin, [facility]

## 2025-02-05 ENCOUNTER — TELEPHONE (OUTPATIENT)
Dept: ORTHOPEDIC SURGERY | Facility: HOSPITAL | Age: 65
End: 2025-02-05
Payer: MEDICARE

## 2025-02-05 RX ORDER — DAPAGLIFLOZIN 10 MG/1
10 TABLET, FILM COATED ORAL DAILY
Qty: 90 TABLET | Refills: 1 | Status: SHIPPED | OUTPATIENT
Start: 2025-02-05

## 2025-02-05 RX ORDER — TIRZEPATIDE 5 MG/.5ML
5 INJECTION, SOLUTION SUBCUTANEOUS
Qty: 6 ML | Refills: 1 | Status: SHIPPED | OUTPATIENT
Start: 2025-02-05 | End: 2025-07-23

## 2025-02-05 NOTE — TELEPHONE ENCOUNTER
Copied from CRM #[id number]. Topic: Information Request - Doctor, Hospital, or Provider  >> Feb 5, 2025 11:29 AM Anali RED wrote:  Martir requested a call back from the nurse regarding sones images and medical advice

## 2025-02-13 ENCOUNTER — OFFICE VISIT (OUTPATIENT)
Dept: ORTHOPEDIC SURGERY | Facility: CLINIC | Age: 65
End: 2025-02-13
Payer: MEDICARE

## 2025-02-13 DIAGNOSIS — G56.02 CARPAL TUNNEL SYNDROME ON LEFT: Primary | ICD-10-CM

## 2025-02-13 PROCEDURE — 1036F TOBACCO NON-USER: CPT | Performed by: ORTHOPAEDIC SURGERY

## 2025-02-13 PROCEDURE — 1125F AMNT PAIN NOTED PAIN PRSNT: CPT | Performed by: ORTHOPAEDIC SURGERY

## 2025-02-13 PROCEDURE — 1159F MED LIST DOCD IN RCRD: CPT | Performed by: ORTHOPAEDIC SURGERY

## 2025-02-13 PROCEDURE — 1123F ACP DISCUSS/DSCN MKR DOCD: CPT | Performed by: ORTHOPAEDIC SURGERY

## 2025-02-13 PROCEDURE — 99204 OFFICE O/P NEW MOD 45 MIN: CPT | Performed by: ORTHOPAEDIC SURGERY

## 2025-02-13 PROCEDURE — 99214 OFFICE O/P EST MOD 30 MIN: CPT | Performed by: ORTHOPAEDIC SURGERY

## 2025-02-13 ASSESSMENT — ENCOUNTER SYMPTOMS
LOSS OF SENSATION IN FEET: 0
OCCASIONAL FEELINGS OF UNSTEADINESS: 0
DEPRESSION: 0

## 2025-02-13 ASSESSMENT — PAIN - FUNCTIONAL ASSESSMENT: PAIN_FUNCTIONAL_ASSESSMENT: 0-10

## 2025-02-13 ASSESSMENT — PAIN SCALES - GENERAL: PAINLEVEL_OUTOF10: 7

## 2025-02-14 ENCOUNTER — TELEPHONE (OUTPATIENT)
Dept: PRIMARY CARE | Facility: CLINIC | Age: 65
End: 2025-02-14
Payer: MEDICARE

## 2025-02-14 DIAGNOSIS — E78.5 HYPERLIPIDEMIA, UNSPECIFIED HYPERLIPIDEMIA TYPE: ICD-10-CM

## 2025-02-14 RX ORDER — EZETIMIBE 10 MG/1
10 TABLET ORAL NIGHTLY
Qty: 90 TABLET | Refills: 0 | Status: SHIPPED | OUTPATIENT
Start: 2025-02-14

## 2025-02-14 NOTE — PROGRESS NOTES
"History of Present Illness:  Chief Complaint   Patient presents with    Left Wrist - Numbness, Pain     EMG done     64-year-old male referred by Dr. Little for evaluation of left hand numbness.  Patient has history of multiple spine surgeries including C2-T1 anterior and posterior cervical decompression and fusion, thoracic spine surgery and lumbar laminectomy.  He has had chronic persistent symptoms, but recently with worsening numbness and tingling into the left hand, particularly radial 3 digits.  Describes a sensation into the fingers that \"feels like sand\".  No relief with bracing.  He did have prior right carpal tunnel release surgery with good improvement of symptoms at that time.      Past Medical History:   Diagnosis Date    Abdominal pain 05/14/2013    Acute low back pain 11/21/2023    Acute pain of right knee 02/04/2022    Altered bowel function 12/21/2022    Angina pectoris 11/21/2023    Bilateral impacted cerumen 12/03/2021    General finding of abdomen 06/11/2024    General finding of abdomen 12/07/2022    High prostate specific antigen (PSA) 08/12/2022    Impacted cerumen 06/11/2024    Intestinal obstruction due to decreased peristalsis (Multi) 06/11/2024    Knee pain 02/04/2022    Muscle weakness 11/21/2023    Pain of right shoulder region 06/11/2024    Personal history of other diseases of the musculoskeletal system and connective tissue     History of low back pain    Skin lesion 03/04/2022    Wound, open, foot 06/11/2024       Medication Documentation Review Audit       Reviewed by Quynh Perales CMA (Medical Assistant) on 02/13/25 at 0939      Medication Order Taking? Sig Documenting Provider Last Dose Status   aspirin 81 mg EC tablet [id number]  Take 1 tablet (81 mg) by mouth twice a day. Historical Provider, MD  Active   atorvastatin (Lipitor) 20 mg tablet [id number]  TAKE ONE TABLET BY MOUTH EVERY DAY Qamar Dunlap MD  Active   baclofen (Lioresal) 10 mg tablet [id number] No TAKE ONE-HALF " TO 1 TABLET BY MOUTH EVERY 8 HOURS AS NEEDED AS INSTRUCTED Historical Provider, MD Taking Active   dapagliflozin propanediol (Farxiga) 10 mg [id number]  Take 1 tablet (10 mg) by mouth once daily. Zara Castellanos MD  Active   ezetimibe (Zetia) 10 mg tablet [id number] No Take 1 tablet (10 mg) by mouth once daily at bedtime. Zara Castellanos MD Taking Active   FreeStyle Heath reader (FreeStyle Heath 2 [place]) misc [id number]  Use as instructed Zara Castellanos MD  Active   FreeStyle Heath sensor system (FreeStyle Heath 2 Sensor) kit [id number]  Use as instructed every 14 days Zara Castellanos MD  Active   gabapentin (Neurontin) 100 mg capsule [id number] No Take 1 capsule (100 mg) by mouth once daily in the morning. Take before meals. Historical Provider, MD Taking Active   gabapentin (Neurontin) 400 mg capsule [id number] No Take 300 mg by mouth once daily at bedtime. Historical Provider, MD Taking Differently Active   glipiZIDE (Glucotrol) 10 mg tablet [id number]  Take 1 tablet (10 mg) by mouth once daily. Zara Castellanos MD  Active   methocarbamol (Robaxin) 500 mg tablet [id number]  TAKE ONE TABLET BY MOUTH THREE TIMES A DAY Ana Wright PA-C  Active   metoprolol succinate XL (Toprol-XL) 25 mg 24 hr tablet [id number] No Take 1 tablet (25 mg) by mouth 2 times a day. Do not crush or chew. Qamar Dunlap MD Taking  24 5160   nitroglycerin (Nitrostat) 0.4 mg SL tablet [id number]  PLACE 1 TABLET UNDER THE TONGUE EVERY 5 MINUTES FOR UP TO 3 DOSES AS NEEDED FOR CHEST PAIN. *CALL 911 IF PAIN PERSISTS.* Qamar Dunlap MD  Active   tirzepatide (Mounjaro) 5 mg/0.5 mL pen injector [id number]  Inject 5 mg under the skin every 7 days. Zara Castellanos MD  Active                    Allergies   Allergen Reactions    Codeine Hives    Iodine Rash    Metformin Diarrhea    Hydrocodone-Homatropine Hives       Social History     Socioeconomic History    Marital status: Single     Spouse name: Not on file    Number of children: Not  on file    Years of education: Not on file    Highest education level: Not on file   Occupational History    Not on file   Tobacco Use    Smoking status: Never    Smokeless tobacco: Never   Vaping Use    Vaping status: Never Used   Substance and Sexual Activity    Alcohol use: Not Currently    Drug use: Never    Sexual activity: Not on file   Other Topics Concern    Not on file   Social History Narrative    Not on file     Social Drivers of Health     Financial Resource Strain: Not on file   Food Insecurity: Not on file   Transportation Needs: Not on file   Physical Activity: Not on file   Stress: Not on file   Social Connections: Not on file   Intimate Partner Violence: Not on file   Housing Stability: Not on file       Past Surgical History:   Procedure Laterality Date    CARDIAC CATHETERIZATION      CARPAL TUNNEL RELEASE      CHOLECYSTECTOMY      JOINT REPLACEMENT      LUMBAR DISCECTOMY      OTHER SURGICAL HISTORY  07/18/2013    Spinal Stereotaxis Stimulation Of Cord          Review of Systems   GENERAL: Negative for malaise, significant weight loss, fever  MUSCULOSKELETAL: see HPI  NEURO:  see HPI     Physical Examination:  Constitutional: Appears well-developed and well-nourished.  Head: Normocephalic and atraumatic.  Eyes: EOMI grossly  Cardiovascular: Intact distal pulses.   Respiratory: Effort normal. No respiratory distress.  Neurologic: Alert and oriented to person, place, and time.  Skin: Skin is warm and dry.  Hematologic / Lymphatic: No lymphedema, lymphangitis.  Psychiatric: normal mood and affect. Behavior is normal.   Musculoskeletal:  left wrist/hand:  Some atrophy of thenar/intrinsics  Positive Tinel's at carpal tunnel  + Median nerve compression test  + Wilda's test  Diminished sensation all digits.  4/5 thumb abduction  Radial pulse palpable  Good capillary refill     EMG: Left upper extremity EMG available for review demonstrates changes consistent with moderately severe left median neuropathy.   Also evidence of chronic C7/C8 radiculopathies.  Reduced amplitude of ulnar sensory nerve, but without focal slowing across the elbow.     Assessment:  Patient with left carpal tunnel syndrome and C7/C8 chronic radiculopathy     Plan:  We reviewed diagnosis as well as risks and benefits of various treatment options.  He understands that addressing carpal tunnel symptoms will not address additional chronic cervical spine issues.  He also understands potential for underlying ulnar nerve issue as well.     The surgical benefits and the potential complications were reviewed with the patient today, as well as the day surgical setting and the likely postoperative course.  Patient understands that the goal of surgery is prevention of worsening symptoms and potential relief of some symptoms.  Risks discussed included, but were not limited to, residual/recurrent/worsening symptoms, pain, infection, bleeding, stiffness, injury to nerve/blood vessels/tendons, wound healing issues and possible need for reoperation.  I answered the patient's questions and surgical consent reviewed and obtained.  The patient has elected to proceed with surgery and we will schedule it at the patient's convenience.  Plan for left open carpal tunnel release.    The patient will followup with us in the operating room and then postoperatively.

## 2025-02-18 ENCOUNTER — PHARMACY VISIT (OUTPATIENT)
Dept: PHARMACY | Facility: CLINIC | Age: 65
End: 2025-02-18
Payer: COMMERCIAL

## 2025-02-18 PROCEDURE — RXMED WILLOW AMBULATORY MEDICATION CHARGE

## 2025-02-19 ENCOUNTER — PHARMACY VISIT (OUTPATIENT)
Dept: PHARMACY | Facility: CLINIC | Age: 65
End: 2025-02-19

## 2025-02-28 ENCOUNTER — APPOINTMENT (OUTPATIENT)
Dept: NEUROLOGY | Facility: CLINIC | Age: 65
End: 2025-02-28
Payer: MEDICARE

## 2025-03-03 DIAGNOSIS — I25.10 CORONARY ARTERY DISEASE, UNSPECIFIED VESSEL OR LESION TYPE, UNSPECIFIED WHETHER ANGINA PRESENT, UNSPECIFIED WHETHER NATIVE OR TRANSPLANTED HEART: ICD-10-CM

## 2025-03-03 RX ORDER — METOPROLOL SUCCINATE 25 MG/1
TABLET, EXTENDED RELEASE ORAL
Qty: 180 TABLET | Refills: 0 | Status: SHIPPED | OUTPATIENT
Start: 2025-03-03

## 2025-04-04 ENCOUNTER — TELEPHONE (OUTPATIENT)
Dept: PRIMARY CARE | Facility: CLINIC | Age: 65
End: 2025-04-04
Payer: MEDICARE

## 2025-04-04 DIAGNOSIS — M54.10 RADICULITIS: ICD-10-CM

## 2025-04-04 RX ORDER — METHOCARBAMOL 500 MG/1
500 TABLET, FILM COATED ORAL 3 TIMES DAILY PRN
Qty: 90 TABLET | Refills: 0 | Status: SHIPPED | OUTPATIENT
Start: 2025-04-04

## 2025-04-04 NOTE — TELEPHONE ENCOUNTER
Patient is requesting a refill on Methocarbanol 500 mg tab granuels.  Patient got this from Dr[facility] Mojica [facility]  He is no longer there and they will not fill.  He takes this for back spasms.  Patient seen last 02/24/25 and is scheduled on 04/25/25 for DM check.  Uses [facility] in [place].   Please advise.

## 2025-04-10 NOTE — DISCHARGE INSTRUCTIONS
Dr. Huggins Post-Operative Instructions  Hand/Wrist/Elbow    Activity:  Rest on the day of surgery.  Gradually resume your regular diet.    Anesthesia:  Numbing medication may last for 8-24 hours.    Post-Operative Medications:  You may resume your regular medications (including blood thinners).  You have been given a prescription for pain medication as needed.  You may also take over-the-counter anti-inflammatories and/or Tylenol for pain relief.  If you are taking the prescribed pain medication you must limit additional Tylenol (acetaminophen) intake to avoid overdose.    Dressings:  Keep your dressings clean and dry.  You may cover with a plastic bag or cast cover and seal bag to your skin above the bandage for showering.  If your dressing becomes wet or significantly bloodstained call the office at [phone].  Okay to remove outer dressings after 2-3 days and shower/wash hands.  Do not soak wound (I.e. no swimming pool, hot tub or dishes).    Post-Operative Care:  Keep the surgical site elevated above the level of your heart to limit swelling.  If your fingers are not included in the dressing you are encouraged to move your fingers regularly (full fist and full extension).  Regularly ice the surgical site.  You may also apply ice pack above the level of the dressing and this will cool the blood as it travels towards the surgical site.    Call Surgeon/Office at any time for:           Office Number: [phone]  Excessive bleeding  Loss of feeling (The local numbing medicine from surgery typically lasts 8-12 hours.  Nerve blocks can last 18-36 hours.)  Tight dressing: Make sure that you are elevating the operative site appropriately.  If no relief then call the office.                                                                                                        Circulation issues:  If fingers change to white or blue  Concerns/Problems with your surgery

## 2025-04-11 ENCOUNTER — APPOINTMENT (OUTPATIENT)
Dept: PRIMARY CARE | Facility: CLINIC | Age: 65
End: 2025-04-11
Payer: MEDICARE

## 2025-04-11 ENCOUNTER — HOSPITAL ENCOUNTER (OUTPATIENT)
Facility: CLINIC | Age: 65
Setting detail: OUTPATIENT SURGERY
Discharge: HOME | End: 2025-04-11
Attending: ORTHOPAEDIC SURGERY | Admitting: ORTHOPAEDIC SURGERY
Payer: MEDICARE

## 2025-04-11 VITALS
RESPIRATION RATE: 16 BRPM | WEIGHT: 180 LBS | OXYGEN SATURATION: 95 % | TEMPERATURE: 97.2 F | HEART RATE: 78 BPM | DIASTOLIC BLOOD PRESSURE: 74 MMHG | HEIGHT: 68 IN | BODY MASS INDEX: 27.28 KG/M2 | SYSTOLIC BLOOD PRESSURE: 143 MMHG

## 2025-04-11 DIAGNOSIS — G56.02 CARPAL TUNNEL SYNDROME ON LEFT: Primary | ICD-10-CM

## 2025-04-11 PROCEDURE — 7100000010 HC PHASE TWO TIME - EACH INCREMENTAL 1 MINUTE: Performed by: ORTHOPAEDIC SURGERY

## 2025-04-11 PROCEDURE — 3600000008 HC OR TIME - EACH INCREMENTAL 1 MINUTE - PROCEDURE LEVEL THREE: Performed by: ORTHOPAEDIC SURGERY

## 2025-04-11 PROCEDURE — 2500000004 HC RX 250 GENERAL PHARMACY W/ HCPCS (ALT 636 FOR OP/ED): Performed by: ORTHOPAEDIC SURGERY

## 2025-04-11 PROCEDURE — 2500000005 HC RX 250 GENERAL PHARMACY W/O HCPCS: Performed by: ORTHOPAEDIC SURGERY

## 2025-04-11 PROCEDURE — 7100000009 HC PHASE TWO TIME - INITIAL BASE CHARGE: Performed by: ORTHOPAEDIC SURGERY

## 2025-04-11 PROCEDURE — 64721 CARPAL TUNNEL SURGERY: CPT | Performed by: ORTHOPAEDIC SURGERY

## 2025-04-11 PROCEDURE — 3600000003 HC OR TIME - INITIAL BASE CHARGE - PROCEDURE LEVEL THREE: Performed by: ORTHOPAEDIC SURGERY

## 2025-04-11 RX ORDER — OXYCODONE HYDROCHLORIDE 5 MG/1
5 TABLET ORAL EVERY 6 HOURS PRN
Qty: 10 TABLET | Refills: 0 | Status: SHIPPED | OUTPATIENT
Start: 2025-04-11

## 2025-04-11 RX ORDER — LIDOCAINE HYDROCHLORIDE AND EPINEPHRINE 10; 10 UG/ML; MG/ML
INJECTION, SOLUTION INFILTRATION; PERINEURAL AS NEEDED
Status: DISCONTINUED | OUTPATIENT
Start: 2025-04-11 | End: 2025-04-11 | Stop reason: HOSPADM

## 2025-04-11 RX ORDER — SODIUM CHLORIDE 0.9 G/100ML
INJECTION, SOLUTION IRRIGATION AS NEEDED
Status: DISCONTINUED | OUTPATIENT
Start: 2025-04-11 | End: 2025-04-11 | Stop reason: HOSPADM

## 2025-04-11 ASSESSMENT — PAIN SCALES - GENERAL: PAINLEVEL_OUTOF10: 0 - NO PAIN

## 2025-04-11 ASSESSMENT — COLUMBIA-SUICIDE SEVERITY RATING SCALE - C-SSRS
6. HAVE YOU EVER DONE ANYTHING, STARTED TO DO ANYTHING, OR PREPARED TO DO ANYTHING TO END YOUR LIFE?: NO
1. IN THE PAST MONTH, HAVE YOU WISHED YOU WERE DEAD OR WISHED YOU COULD GO TO SLEEP AND NOT WAKE UP?: NO
2. HAVE YOU ACTUALLY HAD ANY THOUGHTS OF KILLING YOURSELF?: NO

## 2025-04-11 NOTE — H&P
Patient has failed conservative treatment for left carpal tunnel syndrome.  Presents for carpal tunnel release surgery.    Past Medical History:   Diagnosis Date    Abdominal pain 05/14/2013    Acute low back pain 11/21/2023    Acute pain of right knee 02/04/2022    Altered bowel function 12/21/2022    Angina pectoris 11/21/2023    Bilateral impacted cerumen 12/03/2021    General finding of abdomen 06/11/2024    General finding of abdomen 12/07/2022    High prostate specific antigen (PSA) 08/12/2022    Impacted cerumen 06/11/2024    Intestinal obstruction due to decreased peristalsis (Multi) 06/11/2024    Knee pain 02/04/2022    Muscle weakness 11/21/2023    Pain of right shoulder region 06/11/2024    Personal history of other diseases of the musculoskeletal system and connective tissue     History of low back pain    Skin lesion 03/04/2022    Wound, open, foot 06/11/2024       Medication Documentation Review Audit       Reviewed by Mirian Craig RN (Registered Nurse) on 04/11/25 at 0903      Medication Order Taking? Sig Documenting Provider Last Dose Status   aspirin 81 mg EC tablet [id number]  Take 1 tablet (81 mg) by mouth twice a day. Historical Provider, MD  Active   atorvastatin (Lipitor) 20 mg tablet [id number]  TAKE ONE TABLET BY MOUTH EVERY DAY Qamar Dunlap MD  Active   baclofen (Lioresal) 10 mg tablet [id number]  TAKE ONE-HALF TO 1 TABLET BY MOUTH EVERY 8 HOURS AS NEEDED AS INSTRUCTED Historical Provider, MD  Active   dapagliflozin propanediol (Farxiga) 10 mg [id number]  Take 1 tablet (10 mg) by mouth once daily. Zara Castellanos MD  Active   ezetimibe (Zetia) 10 mg tablet [id number]  TAKE ONE TABLET BY MOUTH ONCE DAILY AT BEDTIME Glendy Murillo, APRN-CNP  Active   FreeStyle Heath reader (FreeStyle Heath 2 [place]) misc [id number]  Use as instructed Zara Castellanos MD  Active   FreeStyle Heath sensor system (FreeStyle Heath 2 Sensor) kit [id number]  Use as instructed every 14 days Zara Castellanos MD  Active    gabapentin (Neurontin) 100 mg capsule [id number]  Take 1 capsule (100 mg) by mouth once daily in the morning. Take before meals. Historical Provider, MD  Active   gabapentin (Neurontin) 400 mg capsule [id number]  Take 300 mg by mouth once daily at bedtime. Historical Provider, MD  Active   glipiZIDE (Glucotrol) 10 mg tablet [id number]  Take 1 tablet (10 mg) by mouth once daily. Zara Castellanos MD  Active   methocarbamol (Robaxin) 500 mg tablet [id number]  Take 1 tablet (500 mg) by mouth 3 times a day as needed for muscle spasms. Zara Castellanos MD  Active   metoprolol succinate XL (Toprol-XL) 25 mg 24 hr tablet [id number]  TAKE ONE TABLET BY MOUTH TWO TIMES A DAY. DO NOT CRUSH OR CHEW. Qamar Dunlap MD  Active   nitroglycerin (Nitrostat) 0.4 mg SL tablet [id number]  PLACE 1 TABLET UNDER THE TONGUE EVERY 5 MINUTES FOR UP TO 3 DOSES AS NEEDED FOR CHEST PAIN. *CALL 911 IF PAIN PERSISTS.* Qamar Dunlap MD  Active   tirzepatide (Mounjaro) 5 mg/0.5 mL pen injector [id number]  Inject 5 mg under the skin every 7 days. Zara Castellanos MD  Active                    Allergies   Allergen Reactions    Codeine Hives    Iodine Rash    Metformin Diarrhea    Hydrocodone-Homatropine Hives       Social History     Socioeconomic History    Marital status: Single     Spouse name: Not on file    Number of children: Not on file    Years of education: Not on file    Highest education level: Not on file   Occupational History    Not on file   Tobacco Use    Smoking status: Never    Smokeless tobacco: Never   Vaping Use    Vaping status: Never Used   Substance and Sexual Activity    Alcohol use: Not Currently    Drug use: Never    Sexual activity: Not on file   Other Topics Concern    Not on file   Social History Narrative    Not on file     Social Drivers of Health     Financial Resource Strain: Not on file   Food Insecurity: Not on file   Transportation Needs: Not on file   Physical Activity: Not on file   Stress: Not on file   Social  Connections: Not on file   Intimate Partner Violence: Not on file   Housing Stability: Not on file       Past Surgical History:   Procedure Laterality Date    CARDIAC CATHETERIZATION      CARPAL TUNNEL RELEASE      CHOLECYSTECTOMY      JOINT REPLACEMENT      LUMBAR DISCECTOMY      OTHER SURGICAL HISTORY  07/18/2013    Spinal Stereotaxis Stimulation Of Cord        Left hand: Positive Durkan's    Assessment/plan: Failure of conservative treatment for left carpal tunnel syndrome.  Plan for surgical intervention, as indicated and scheduled.

## 2025-04-11 NOTE — OP NOTE
Pre-Operative Diagnosis: left carpal tunnel syndrome  Post-Operative Diagnosis: same  Procedure: left carpal tunnel release  Surgeon: Joseluis  Assistant: Gio  Anesthesia: Local   Complications: none  Estimated blood loss: minimal  Specimen: none  Findings: See below  Disposition: Good/PACU      Indications: Patient has failed conservative treatment for left carpal tunnel syndrome. After reviewing risks and benefits of continued nonoperative versus operative treatment they have decided to proceed with open carpal tunnel release. Patient understands that primary goal of surgery is to prevent further worsening of symptoms, but that there is no guarantee.  Given the severity of his current symptoms he will likely have some degree of residual symptoms.  Expected operative and postoperative courses reviewed and all questions addressed.    Operative course: Patient was greeted in the preoperative holding area and the operative site was marked with indelible marker.  After confirming patient identifiers and surgical site a 10 mL mixture of lidocaine with epinephrine and sodium bicarbonate was infiltrated about the planned incision site using sterile technique.  Patient was brought back to the operating room suite where left upper extremity was prepped and draped in standard sterile fashion and timeout procedure was performed as per standard protocol.  Longitudinal incision made overlying level of transverse carpal ligament in line with the radial border of the ring finger. Gentle spreading was carried down through the palmar fascia and transverse carpal ligament was identified and sharply released in a distal to proximal direction under direct visualization. Complete release proximally was confirmed visually as well as by palpation. Gentle spreading distally also confirmed complete release with visualization of approximately 5 mm of distal sentinel fat.  Median nerve was inspected and confirmed to be fully intact.  Wound was then irrigated and reapproximated with 4-0 Monocryl suture in a buried interrupted fashion followed by Dermabond on the skin.  Dry sterile dressings were applied and patient was taken to the recovery room for further care.    Patient will follow-up in 2 weeks for clinical check.

## 2025-04-14 ASSESSMENT — PAIN SCALES - GENERAL: PAINLEVEL_OUTOF10: 3

## 2025-04-14 NOTE — SIGNIFICANT EVENT
"Pt states he has swelling of the thumb and \"a few\" fingers. There is no visible discoloration of the fingers. States the pain \"comes and goes.\" He has taken pain medication, with no relief.  Instructed pt to give Dr Huggins's office a call.  "

## 2025-04-25 ENCOUNTER — TELEPHONE (OUTPATIENT)
Dept: PRIMARY CARE | Facility: CLINIC | Age: 65
End: 2025-04-25

## 2025-04-25 ENCOUNTER — APPOINTMENT (OUTPATIENT)
Dept: PRIMARY CARE | Facility: CLINIC | Age: 65
End: 2025-04-25
Payer: MEDICARE

## 2025-04-25 ENCOUNTER — OFFICE VISIT (OUTPATIENT)
Dept: ORTHOPEDIC SURGERY | Facility: CLINIC | Age: 65
End: 2025-04-25
Payer: MEDICARE

## 2025-04-25 VITALS
DIASTOLIC BLOOD PRESSURE: 66 MMHG | WEIGHT: 180 LBS | BODY MASS INDEX: 27.37 KG/M2 | HEART RATE: 80 BPM | OXYGEN SATURATION: 96 % | SYSTOLIC BLOOD PRESSURE: 122 MMHG | TEMPERATURE: 98 F

## 2025-04-25 VITALS — HEIGHT: 68 IN | BODY MASS INDEX: 27.28 KG/M2 | WEIGHT: 180 LBS

## 2025-04-25 DIAGNOSIS — E11.65 TYPE 2 DIABETES MELLITUS WITH HYPERGLYCEMIA, WITHOUT LONG-TERM CURRENT USE OF INSULIN: Primary | ICD-10-CM

## 2025-04-25 DIAGNOSIS — E11.65 TYPE 2 DIABETES MELLITUS WITH HYPERGLYCEMIA, WITHOUT LONG-TERM CURRENT USE OF INSULIN: ICD-10-CM

## 2025-04-25 DIAGNOSIS — G56.02 CARPAL TUNNEL SYNDROME ON LEFT: Primary | ICD-10-CM

## 2025-04-25 DIAGNOSIS — E78.5 HYPERLIPIDEMIA, UNSPECIFIED HYPERLIPIDEMIA TYPE: ICD-10-CM

## 2025-04-25 LAB — POC HEMOGLOBIN A1C: 7.1 % (ref 4.2–6.5)

## 2025-04-25 PROCEDURE — 3078F DIAST BP <80 MM HG: CPT | Performed by: STUDENT IN AN ORGANIZED HEALTH CARE EDUCATION/TRAINING PROGRAM

## 2025-04-25 PROCEDURE — 99213 OFFICE O/P EST LOW 20 MIN: CPT | Performed by: STUDENT IN AN ORGANIZED HEALTH CARE EDUCATION/TRAINING PROGRAM

## 2025-04-25 PROCEDURE — 3008F BODY MASS INDEX DOCD: CPT

## 2025-04-25 PROCEDURE — 83036 HEMOGLOBIN GLYCOSYLATED A1C: CPT | Performed by: STUDENT IN AN ORGANIZED HEALTH CARE EDUCATION/TRAINING PROGRAM

## 2025-04-25 PROCEDURE — 1125F AMNT PAIN NOTED PAIN PRSNT: CPT

## 2025-04-25 PROCEDURE — 1159F MED LIST DOCD IN RCRD: CPT | Performed by: STUDENT IN AN ORGANIZED HEALTH CARE EDUCATION/TRAINING PROGRAM

## 2025-04-25 PROCEDURE — 1036F TOBACCO NON-USER: CPT

## 2025-04-25 PROCEDURE — 1159F MED LIST DOCD IN RCRD: CPT

## 2025-04-25 PROCEDURE — 1123F ACP DISCUSS/DSCN MKR DOCD: CPT

## 2025-04-25 PROCEDURE — RXMED WILLOW AMBULATORY MEDICATION CHARGE

## 2025-04-25 PROCEDURE — 99211 OFF/OP EST MAY X REQ PHY/QHP: CPT

## 2025-04-25 PROCEDURE — G2211 COMPLEX E/M VISIT ADD ON: HCPCS | Performed by: STUDENT IN AN ORGANIZED HEALTH CARE EDUCATION/TRAINING PROGRAM

## 2025-04-25 PROCEDURE — 3074F SYST BP LT 130 MM HG: CPT | Performed by: STUDENT IN AN ORGANIZED HEALTH CARE EDUCATION/TRAINING PROGRAM

## 2025-04-25 PROCEDURE — 3051F HG A1C>EQUAL 7.0%<8.0%: CPT | Performed by: STUDENT IN AN ORGANIZED HEALTH CARE EDUCATION/TRAINING PROGRAM

## 2025-04-25 PROCEDURE — 1125F AMNT PAIN NOTED PAIN PRSNT: CPT | Performed by: STUDENT IN AN ORGANIZED HEALTH CARE EDUCATION/TRAINING PROGRAM

## 2025-04-25 RX ORDER — TIRZEPATIDE 5 MG/.5ML
5 INJECTION, SOLUTION SUBCUTANEOUS
Qty: 6 ML | Refills: 3 | Status: SHIPPED | OUTPATIENT
Start: 2025-04-25 | End: 2026-03-27

## 2025-04-25 RX ORDER — DAPAGLIFLOZIN 10 MG/1
10 TABLET, FILM COATED ORAL DAILY
Qty: 90 TABLET | Refills: 3 | Status: SHIPPED | OUTPATIENT
Start: 2025-04-25

## 2025-04-25 ASSESSMENT — PAIN SCALES - GENERAL
PAINLEVEL_OUTOF10: 3
PAINLEVEL_OUTOF10: 7

## 2025-04-25 ASSESSMENT — PAIN - FUNCTIONAL ASSESSMENT: PAIN_FUNCTIONAL_ASSESSMENT: 0-10

## 2025-04-25 ASSESSMENT — PATIENT HEALTH QUESTIONNAIRE - PHQ9
SUM OF ALL RESPONSES TO PHQ9 QUESTIONS 1 AND 2: 0
2. FEELING DOWN, DEPRESSED OR HOPELESS: NOT AT ALL
1. LITTLE INTEREST OR PLEASURE IN DOING THINGS: NOT AT ALL

## 2025-04-25 ASSESSMENT — PAIN DESCRIPTION - DESCRIPTORS: DESCRIPTORS: SHARP

## 2025-04-25 NOTE — PROGRESS NOTES
DM FOLLOW UP  E11.9 - Type 2 diabetes    Martir Johnson is a 65 y.o. male here today at the request of Referring Provider: Zara Castellanos MD for my opinion regarding diabetes management.  My final recommendations will be communicated back to the requesting provider by way of shared medical record.     Patient is approved for VAF     Subjective   Past Medical History:  He has a past medical history of Abdominal pain (05/14/2013), Acute low back pain (11/21/2023), Acute pain of right knee (02/04/2022), Altered bowel function (12/21/2022), Angina pectoris (11/21/2023), Bilateral impacted cerumen (12/03/2021), General finding of abdomen (06/11/2024), General finding of abdomen (12/07/2022), High prostate specific antigen (PSA) (08/12/2022), Impacted cerumen (06/11/2024), Intestinal obstruction due to decreased peristalsis (Multi) (06/11/2024), Knee pain (02/04/2022), Muscle weakness (11/21/2023), Pain of right shoulder region (06/11/2024), Personal history of other diseases of the musculoskeletal system and connective tissue, Skin lesion (03/04/2022), and Wound, open, foot (06/11/2024).    Social History:  He reports that he has never smoked. He has never used smokeless tobacco. He reports that he does not currently use alcohol. He reports that he does not use drugs.    Allergies:  Codeine, Iodine, Metformin, and Hydrocodone-homatropine    CURRENT PHARMACOTHERAPY   Current Outpatient Medications   Medication Instructions    aspirin 81 mg, 2 times daily    atorvastatin (LIPITOR) 20 mg, oral, Daily    baclofen (Lioresal) 10 mg tablet TAKE ONE-HALF TO 1 TABLET BY MOUTH EVERY 8 HOURS AS NEEDED AS INSTRUCTED    dapagliflozin propanediol (FARXIGA) 10 mg, oral, Daily    ezetimibe (ZETIA) 10 mg, oral, Nightly    gabapentin (NEURONTIN) 100 mg, Daily before breakfast    gabapentin (NEURONTIN) 300 mg, Nightly    glipiZIDE (GLUCOTROL) 10 mg, oral, Daily    methocarbamol (ROBAXIN) 500 mg, oral, 3 times daily PRN    metoprolol  succinate XL (Toprol-XL) 25 mg 24 hr tablet TAKE ONE TABLET BY MOUTH TWO TIMES A DAY. DO NOT CRUSH OR CHEW.    Mounjaro 5 mg, subcutaneous, Every 7 days    nitroglycerin (Nitrostat) 0.4 mg SL tablet PLACE 1 TABLET UNDER THE TONGUE EVERY 5 MINUTES FOR UP TO 3 DOSES AS NEEDED FOR CHEST PAIN. *CALL 911 IF PAIN PERSISTS.*     Pt denies SE/intolerances  Reviewed all medications by prescribing practitioner (such as prescriptions, OTCs, herbal therapies, supplements) and documented in the medical record.     Reviewed need for secondary prevention: Statins, ACE-I/ARB, Aspirin    Primary/Secondary Prevention   - Statin? Yes, also taking Zetia  - ACE-I/ARB? No  - Aspirin? No    Pertinent PMH Review:  - PMH of Pancreatitis: No  - PMH of Retinopathy: No  - PMH of Urinary Tract Infections: No  - PMH of MTC: No    Glucose Monitoring  Patient is not checking glucose at home, has glucometer and supplies available    Lifestyle:  Current diet: in general, watches portion size  Current exercise: no regular exercise    Last Labs/Vitals/Meds  POC HEMOGLOBIN A1c (%)   Date Value   04/25/2025 7.1 (A)     Hemoglobin A1C (%)   Date Value   10/25/2024 9.9 (H)   08/14/2024 10.4 (H)   02/08/2024 8.9 (H)   11/17/2023 8.1 (H)   08/16/2023 8.2 (H)   04/18/2023 7.5 (H)   12/19/2022 7.5 (H)   12/07/2022 7.2 (H)   10/31/2022 6.7 (A)   08/11/2022 7.1 (H)   01/26/2022 7.2 (H)     Glucose (mg/dL)   Date Value   10/25/2024 206 (H)     Creatinine (mg/dL)   Date Value   10/25/2024 0.78     eGFR (mL/min/1.73m*2)   Date Value   10/25/2024 >90       BP Readings from Last 3 Encounters:   04/25/25 122/66   04/11/25 143/74   01/27/25 128/76        Wt Readings from Last 6 Encounters:   04/25/25 81.6 kg (180 lb)   04/25/25 81.6 kg (180 lb)   04/11/25 81.6 kg (180 lb)   01/27/25 80.7 kg (178 lb)   01/10/25 78 kg (172 lb)   01/03/25 80.7 kg (177 lb 14.6 oz)     BMI Readings from Last 6 Encounters:   04/25/25 27.37 kg/m²   04/25/25 27.37 kg/m²   04/11/25 27.37 kg/m²    01/27/25 27.06 kg/m²   01/10/25 26.15 kg/m²   01/03/25 27.28 kg/m²       Assessment:  Patients diabetes is improved with most recent A1c of 7.1% (Goal < 7%).  Was 9.9% 6 months ago  Compliance at present is estimated to be good  Discussed continue all medications at current dosages  Rx's sent to [facility] pharmacy for [facility] assistance       Plan:  1.  Continue all medications at current dosages  2.  Follow up in 6 months with clinical pharmacist         Data reviewed and evaluated by OCLETTE Kevin RP, [facility]  Treatment and plan changes discussed with Zara Castellanos MD

## 2025-04-25 NOTE — PROGRESS NOTES
"Subjective   Martir Johnson is a 65 y.o. male who presents for Diabetes and Abdominal Pain (C/o right side abdominal \"bone\" pain x since thoracic surgery/Starts are right side and radiates to middle of abdomen. Has noticed a lump that was there before surgery).    INTERVAL HX/CURRENT CONCERNS:   -continues to have same pain in Rt side of abdomen. Feels it radiates around from his back. Was told previously would need another back surgery. Plans to discuss with his orthopedic surgeon this pain and see if he feels may be related to his chronic back issues and if it could get better with surgery/procedures.      #T2DM review  Current medication regimen:   -Farxiga 10 mg  -Glipizide 10 mg   -Mounjaro 5 mg      - stopped 12/2023 d/t GI side effects  Tolerating current medication regimen yes  CGM? No - was scheduled to set up but did not make appt  Hypoglycemic episodes no  Last A1C : 7.1 POC A1c today (4/25/25), 9.9, 10.4, 8.9  Last eGFR -await labs  Last Creatinine - await labs  Last microalbumin - needs, previously ordered not done  Denies any change in diet. Not checking blood sugars. Never followed up to get CGM, does not desire at this time     Patient is not prescribed ACE/ARB/ARNI medication   Patient is prescribed a STATIN medication  Patient is prescribed a SGLT2/GLP1 medication    Last Flu Vaccine given:            01/10/2025   Last PCV Vaccine given:   01/10/2025    Health Maintenance  Immunizations:     Tdap 3/2024    Pneumococcal - PCV20 04046    Shingles - considering     COVID - considering    Influenza -1/2025  Colonoscopy: 4/2024  Lung cancer screening: Never smoker     Lab Results   Component Value Date    HGBA1C 7.1 (A) 04/25/2025    CREATININE 1.00 04/25/2025    MICROALBCREA 9.0 08/16/2023    LDLCALC 61 10/25/2024        Review of Systems   Constitutional:  Negative for chills and fever.   Eyes:  Negative for visual disturbance.   Respiratory:  Negative for cough, chest tightness, shortness of breath " and wheezing.    Cardiovascular:  Negative for chest pain, palpitations and leg swelling.   Gastrointestinal:  Positive for abdominal pain (chronic, unchanged).   Musculoskeletal:  Positive for back pain (chronic).     Visit Vitals  /66   Pulse 80   Temp 36.7 °C (98 °F)   Body mass index is 27.37 kg/m².   Objective   Physical Exam  Constitutional:       Appearance: Normal appearance.   Cardiovascular:      Rate and Rhythm: Normal rate and regular rhythm.      Heart sounds: Normal heart sounds.   Pulmonary:      Effort: Pulmonary effort is normal.      Breath sounds: Normal breath sounds. No wheezing, rhonchi or rales.   Musculoskeletal:      Right lower leg: No edema.      Left lower leg: No edema.   Neurological:      Mental Status: He is alert.       Assessment & Plan  Type 2 diabetes mellitus with hyperglycemia, without long-term current use of insulin  Improved from prior with A1c now at 7.1 from 9.9  Will have labs drawn   Continue current medication regimen.   Patient is on statin  Routine diabetic retinopathy screening discussed   Discussed dietary efforts and physical activity    Orders:    POCT glycosylated hemoglobin (Hb A1C) manually resulted    Collection of Capillary Blood Specimen; Future       Zara Castellanos MD 05/10/25 10:42 AM

## 2025-04-25 NOTE — ASSESSMENT & PLAN NOTE
Improved from prior with A1c now at 7.1 from 9.9  Will have labs drawn   Continue current medication regimen.   Patient is on statin  Routine diabetic retinopathy screening discussed   Discussed dietary efforts and physical activity    Orders:    POCT glycosylated hemoglobin (Hb A1C) manually resulted    Collection of Capillary Blood Specimen; Future

## 2025-04-25 NOTE — PROGRESS NOTES
HPI:  Martir is a 65 year old male presenting for follow up after left carpal tunnel release on 4/11/25. Patient states incision is healing well and his carpal tunnel symptoms have resolved, but he is having pain at his left thumb. He states there was significant swelling around the thumb, which has resolved. His thumb pain is radial and dorsal, worse at the CMC joint. He denies numbness, weakness, redness, and discharge. Patient stated he followed the post op instructions for wound care.     Past Medical History:   Medical History[1]    Past Surgical History:   Surgical History[2]    Medications:   Medications Ordered Prior to Encounter[3]    Physical Exam:   Constitutional: Well nourished, well developed male not in acute distress   Psychiatric: Normal mood, appropriate affect  Eyes: Sclera anicteric   HENT: Normocephalic atraumatic  CV: Regular rate and rhythm  Respiratory: Nonlabored breathing  Integumentary: No rash  Neurologic: A&O x3, moves all extremities     Hand/Wrist Musculoskeletal Exam    Inspection    Left      Left hand/wrist inspection is normal.      Incision: clean and well-healed    Palpation    Left      Thumb tenderness to palpation: proximal phalanx, metacarpophalangeal joint and carpometacarpal joint      Dorsal hand - 1st metacarpal tenderness to palpation: CMC      Wrist tenderness to palpation: radial carpal joint    Range of Motion     Left Hand      Left hand range of motion is normal.         Strength     Left Hand      Left hand strength is normal.       Extensor pollicis longus: 5/5.       Abductor pollicis brevis: 5/5.       Thumb opposition: 5/5.        Neurovascular    Left       Left neurovascular exam is normal.    Special Tests    Left      Finkelstein's test: negative      Assessment:   Status post left carpal tunnel release, postoperative pain     Plan:   History, physical exam, and imaging were discussed with the patient. Discussing continuing RICE and anti-inflammatories as  needed. Monitor pain in thumb, likely caused by swelling and inflammation from surgery vs CMC arthritis. Continue wound care for incision.   Education: May take up to 6 weeks after surgery before the wrist is healed  Follow up: As needed if pain persists or worsens     GIBSON Toth  4/25/25          [1]   Past Medical History:  Diagnosis Date    Abdominal pain 05/14/2013    Acute low back pain 11/21/2023    Acute pain of right knee 02/04/2022    Altered bowel function 12/21/2022    Angina pectoris 11/21/2023    Bilateral impacted cerumen 12/03/2021    General finding of abdomen 06/11/2024    General finding of abdomen 12/07/2022    High prostate specific antigen (PSA) 08/12/2022    Impacted cerumen 06/11/2024    Intestinal obstruction due to decreased peristalsis (Multi) 06/11/2024    Knee pain 02/04/2022    Muscle weakness 11/21/2023    Pain of right shoulder region 06/11/2024    Personal history of other diseases of the musculoskeletal system and connective tissue     History of low back pain    Skin lesion 03/04/2022    Wound, open, foot 06/11/2024   [2]   Past Surgical History:  Procedure Laterality Date    CARDIAC CATHETERIZATION      CARPAL TUNNEL RELEASE      CHOLECYSTECTOMY      JOINT REPLACEMENT      LUMBAR DISCECTOMY      OTHER SURGICAL HISTORY  07/18/2013    Spinal Stereotaxis Stimulation Of Cord   [3]   Current Outpatient Medications on File Prior to Visit   Medication Sig Dispense Refill    aspirin 81 mg EC tablet Take 1 tablet (81 mg) by mouth twice a day.      atorvastatin (Lipitor) 20 mg tablet TAKE ONE TABLET BY MOUTH EVERY DAY 90 tablet 3    baclofen (Lioresal) 10 mg tablet TAKE ONE-HALF TO 1 TABLET BY MOUTH EVERY 8 HOURS AS NEEDED AS INSTRUCTED      dapagliflozin propanediol (Farxiga) 10 mg Take 1 tablet (10 mg) by mouth once daily. 90 tablet 1    ezetimibe (Zetia) 10 mg tablet TAKE ONE TABLET BY MOUTH ONCE DAILY AT BEDTIME 90 tablet 0    FreeStyle Heath reader (FreeStyle Heath 2 [place])  misc Use as instructed 1 each 0    FreeStyle Heath sensor system (FreeStyle Heath 2 Sensor) kit Use as instructed every 14 days 2 each 5    gabapentin (Neurontin) 100 mg capsule Take 1 capsule (100 mg) by mouth once daily in the morning. Take before meals.      gabapentin (Neurontin) 400 mg capsule Take 300 mg by mouth once daily at bedtime.      glipiZIDE (Glucotrol) 10 mg tablet Take 1 tablet (10 mg) by mouth once daily. 90 tablet 2    methocarbamol (Robaxin) 500 mg tablet Take 1 tablet (500 mg) by mouth 3 times a day as needed for muscle spasms. 90 tablet 0    metoprolol succinate XL (Toprol-XL) 25 mg 24 hr tablet TAKE ONE TABLET BY MOUTH TWO TIMES A DAY. DO NOT CRUSH OR CHEW. 180 tablet 0    nitroglycerin (Nitrostat) 0.4 mg SL tablet PLACE 1 TABLET UNDER THE TONGUE EVERY 5 MINUTES FOR UP TO 3 DOSES AS NEEDED FOR CHEST PAIN. *CALL 911 IF PAIN PERSISTS.* 25 tablet 0    oxyCODONE (Roxicodone) 5 mg immediate release tablet Take 1 tablet (5 mg) by mouth every 6 hours if needed for severe pain (7 - 10). 10 tablet 0    tirzepatide (Mounjaro) 5 mg/0.5 mL pen injector Inject 5 mg under the skin every 7 days. 6 mL 1     No current facility-administered medications on file prior to visit.

## 2025-04-26 LAB
ALBUMIN SERPL-MCNC: 4.7 G/DL (ref 3.6–5.1)
ALP SERPL-CCNC: 55 U/L (ref 35–144)
ALT SERPL-CCNC: 22 U/L (ref 9–46)
ANION GAP SERPL CALCULATED.4IONS-SCNC: 9 MMOL/L (CALC) (ref 7–17)
APPEARANCE UR: CLEAR
AST SERPL-CCNC: 20 U/L (ref 10–35)
BILIRUB SERPL-MCNC: 0.9 MG/DL (ref 0.2–1.2)
BILIRUB UR QL STRIP: NEGATIVE
BUN SERPL-MCNC: 15 MG/DL (ref 7–25)
CALCIUM SERPL-MCNC: 9.3 MG/DL (ref 8.6–10.3)
CHLORIDE SERPL-SCNC: 104 MMOL/L (ref 98–110)
CO2 SERPL-SCNC: 25 MMOL/L (ref 20–32)
COLOR UR: YELLOW
CREAT SERPL-MCNC: 1 MG/DL (ref 0.7–1.35)
EGFRCR SERPLBLD CKD-EPI 2021: 83 ML/MIN/1.73M2
EST. AVERAGE GLUCOSE BLD GHB EST-MCNC: 157 MG/DL
EST. AVERAGE GLUCOSE BLD GHB EST-SCNC: 8.7 MMOL/L
GLUCOSE SERPL-MCNC: 187 MG/DL (ref 65–139)
GLUCOSE UR QL STRIP: ABNORMAL
HBA1C MFR BLD: 7.1 %
HGB UR QL STRIP: NEGATIVE
KETONES UR QL STRIP: NEGATIVE
LEUKOCYTE ESTERASE UR QL STRIP: NEGATIVE
NITRITE UR QL STRIP: NEGATIVE
PH UR STRIP: 5.5 [PH] (ref 5–8)
POTASSIUM SERPL-SCNC: 4.4 MMOL/L (ref 3.5–5.3)
PROT SERPL-MCNC: 7.4 G/DL (ref 6.1–8.1)
PROT UR QL STRIP: NEGATIVE
SODIUM SERPL-SCNC: 138 MMOL/L (ref 135–146)
SP GR UR STRIP: 1.03 (ref 1–1.03)

## 2025-04-29 ENCOUNTER — PHARMACY VISIT (OUTPATIENT)
Dept: PHARMACY | Facility: CLINIC | Age: 65
End: 2025-04-29
Payer: COMMERCIAL

## 2025-05-10 ASSESSMENT — ENCOUNTER SYMPTOMS
CHEST TIGHTNESS: 0
WHEEZING: 0
COUGH: 0
PALPITATIONS: 0
ABDOMINAL PAIN: 1
FEVER: 0
SHORTNESS OF BREATH: 0
CHILLS: 0
BACK PAIN: 1

## 2025-05-16 ENCOUNTER — PHARMACY VISIT (OUTPATIENT)
Dept: PHARMACY | Facility: CLINIC | Age: 65
End: 2025-05-16
Payer: COMMERCIAL

## 2025-05-16 PROCEDURE — RXMED WILLOW AMBULATORY MEDICATION CHARGE

## 2025-05-23 ENCOUNTER — TELEPHONE (OUTPATIENT)
Dept: PRIMARY CARE | Facility: CLINIC | Age: 65
End: 2025-05-23
Payer: MEDICARE

## 2025-05-23 DIAGNOSIS — M54.10 RADICULITIS: ICD-10-CM

## 2025-05-23 RX ORDER — METHOCARBAMOL 500 MG/1
500 TABLET, FILM COATED ORAL 3 TIMES DAILY PRN
Qty: 90 TABLET | Refills: 1 | Status: SHIPPED | OUTPATIENT
Start: 2025-05-23

## 2025-05-23 RX ORDER — METHOCARBAMOL 500 MG/1
500 TABLET, FILM COATED ORAL 3 TIMES DAILY PRN
Qty: 90 TABLET | Refills: 0 | OUTPATIENT
Start: 2025-05-23

## 2025-06-19 DIAGNOSIS — I25.10 CORONARY ARTERY DISEASE, UNSPECIFIED VESSEL OR LESION TYPE, UNSPECIFIED WHETHER ANGINA PRESENT, UNSPECIFIED WHETHER NATIVE OR TRANSPLANTED HEART: ICD-10-CM

## 2025-06-19 DIAGNOSIS — E78.5 HYPERLIPIDEMIA, UNSPECIFIED HYPERLIPIDEMIA TYPE: ICD-10-CM

## 2025-06-20 RX ORDER — EZETIMIBE 10 MG/1
10 TABLET ORAL NIGHTLY
Qty: 90 TABLET | Refills: 2 | Status: SHIPPED | OUTPATIENT
Start: 2025-06-20

## 2025-06-20 RX ORDER — METOPROLOL SUCCINATE 25 MG/1
TABLET, EXTENDED RELEASE ORAL
Qty: 180 TABLET | Refills: 0 | Status: SHIPPED | OUTPATIENT
Start: 2025-06-20

## 2025-07-29 ENCOUNTER — APPOINTMENT (OUTPATIENT)
Dept: CARDIOLOGY | Facility: HOSPITAL | Age: 65
End: 2025-07-29
Payer: MEDICARE

## 2025-07-29 VITALS
DIASTOLIC BLOOD PRESSURE: 65 MMHG | HEART RATE: 75 BPM | BODY MASS INDEX: 28.34 KG/M2 | SYSTOLIC BLOOD PRESSURE: 105 MMHG | HEIGHT: 68 IN | WEIGHT: 187 LBS

## 2025-07-29 DIAGNOSIS — I25.10 ARTERIOSCLEROSIS OF CORONARY ARTERY: Primary | ICD-10-CM

## 2025-07-29 LAB
ATRIAL RATE: 75 BPM
P AXIS: 46 DEGREES
P OFFSET: 205 MS
P ONSET: 149 MS
PR INTERVAL: 148 MS
Q ONSET: 223 MS
QRS COUNT: 13 BEATS
QRS DURATION: 96 MS
QT INTERVAL: 372 MS
QTC CALCULATION(BAZETT): 415 MS
QTC FREDERICIA: 400 MS
R AXIS: -4 DEGREES
T AXIS: 48 DEGREES
T OFFSET: 409 MS
VENTRICULAR RATE: 75 BPM

## 2025-07-29 PROCEDURE — 1159F MED LIST DOCD IN RCRD: CPT | Performed by: INTERNAL MEDICINE

## 2025-07-29 PROCEDURE — 3008F BODY MASS INDEX DOCD: CPT | Performed by: INTERNAL MEDICINE

## 2025-07-29 PROCEDURE — 93005 ELECTROCARDIOGRAM TRACING: CPT | Performed by: INTERNAL MEDICINE

## 2025-07-29 PROCEDURE — 99212 OFFICE O/P EST SF 10 MIN: CPT

## 2025-07-29 PROCEDURE — 99214 OFFICE O/P EST MOD 30 MIN: CPT | Performed by: INTERNAL MEDICINE

## 2025-07-29 PROCEDURE — 1036F TOBACCO NON-USER: CPT | Performed by: INTERNAL MEDICINE

## 2025-07-29 PROCEDURE — 3051F HG A1C>EQUAL 7.0%<8.0%: CPT | Performed by: INTERNAL MEDICINE

## 2025-07-29 PROCEDURE — 1160F RVW MEDS BY RX/DR IN RCRD: CPT | Performed by: INTERNAL MEDICINE

## 2025-07-29 PROCEDURE — 3074F SYST BP LT 130 MM HG: CPT | Performed by: INTERNAL MEDICINE

## 2025-07-29 PROCEDURE — 3078F DIAST BP <80 MM HG: CPT | Performed by: INTERNAL MEDICINE

## 2025-07-29 NOTE — PROGRESS NOTES
SUBJECTIVE:  Patient returns for a routine 6-month follow-up.  He is a pleasant 65-year-old gentleman with hypertension, hyperlipidemia and diabetes.  He also  has documented multivessel coronary artery disease that we have been managing medically due to the rather diffuse nature of the disease.    He has generally been doing well since his last visit.  He denies any angina and has not needed any nitroglycerin.  He continues to work on his diabetic control but is having trouble tolerating Mounjaro.    He has not had any hospitalizations and denies any other new health concerns.  He is taking all of his medications compliantly and is generally tolerating them well except for the Mounjaro.  He did not need any prescriptions renewed.    OBJECTIVE:    GENERAL:   Alert, usual self  HEENT: Unchanged  LUNGS: Clear with good air exchange  CARDIAC: Normal S1 and S2 nontender  ABDOMEN:  EXTREMITIES: No edema no acute rash  SKIN: No acute rash  NEURO: Grossly intact and unchanged.    EKG: Normal sinus rhythm.  Within normal limits    LIPIDS: Cholesterol-137, HDL-41, LDL-61, TG-174.    IMPRESSION/PLAN:  Martir is generally doing well clinically at this time.  He remains free of angina on appropriate medical therapy for his diffuse coronary disease.  I did not think we needed to embark on any repeat testing at this time and did not think we needed to make any additional medication changes.    His blood pressure remains under very good control on metoprolol.  He knows to call for any progressive presyncopal type symptoms in which case we may need to decrease the dose of metoprolol a bit.    He remains on appropriate combination therapy for his hyperlipidemia and his LDL is essentially at goal.    I will see him back for routine follow-up in 6 months, but he knows to call for any intercurrent concerns before then.    PATIENT INSTRUCTIONS:    Continue current medications unchanged.    Return to clinic in 6 months.    Call for any  intercurrent concerns.

## 2025-08-02 DIAGNOSIS — M54.16 LUMBAR RADICULOPATHY: Primary | ICD-10-CM

## 2025-08-02 DIAGNOSIS — M51.26 DISPLACEMENT OF LUMBAR INTERVERTEBRAL DISC WITHOUT MYELOPATHY: ICD-10-CM

## 2025-08-11 PROCEDURE — RXMED WILLOW AMBULATORY MEDICATION CHARGE

## 2025-08-12 ENCOUNTER — PHARMACY VISIT (OUTPATIENT)
Dept: PHARMACY | Facility: CLINIC | Age: 65
End: 2025-08-12
Payer: COMMERCIAL

## 2025-08-14 ENCOUNTER — HOSPITAL ENCOUNTER (OUTPATIENT)
Dept: PAIN MEDICINE | Facility: CLINIC | Age: 65
Discharge: HOME | End: 2025-08-14
Payer: COMMERCIAL

## 2025-08-14 VITALS — RESPIRATION RATE: 18 BRPM | OXYGEN SATURATION: 96 % | HEART RATE: 79 BPM

## 2025-08-14 DIAGNOSIS — M54.16 LUMBAR RADICULOPATHY: ICD-10-CM

## 2025-08-14 DIAGNOSIS — M51.26 DISPLACEMENT OF LUMBAR INTERVERTEBRAL DISC WITHOUT MYELOPATHY: ICD-10-CM

## 2025-08-14 PROCEDURE — 7100000009 HC PHASE TWO TIME - INITIAL BASE CHARGE

## 2025-08-14 PROCEDURE — 7100000010 HC PHASE TWO TIME - EACH INCREMENTAL 1 MINUTE

## 2025-08-14 PROCEDURE — 64483 NJX AA&/STRD TFRM EPI L/S 1: CPT | Mod: LT | Performed by: INTERNAL MEDICINE

## 2025-08-14 PROCEDURE — 2500000004 HC RX 250 GENERAL PHARMACY W/ HCPCS (ALT 636 FOR OP/ED): Mod: JZ | Performed by: INTERNAL MEDICINE

## 2025-08-14 PROCEDURE — 64484 NJX AA&/STRD TFRM EPI L/S EA: CPT | Mod: LT | Performed by: INTERNAL MEDICINE

## 2025-08-14 RX ORDER — METHYLPREDNISOLONE ACETATE 40 MG/ML
INJECTION, SUSPENSION INTRA-ARTICULAR; INTRALESIONAL; INTRAMUSCULAR; SOFT TISSUE AS NEEDED
Status: COMPLETED | OUTPATIENT
Start: 2025-08-14 | End: 2025-08-14

## 2025-08-14 RX ORDER — LIDOCAINE HYDROCHLORIDE 10 MG/ML
INJECTION, SOLUTION EPIDURAL; INFILTRATION; INTRACAUDAL; PERINEURAL AS NEEDED
Status: COMPLETED | OUTPATIENT
Start: 2025-08-14 | End: 2025-08-14

## 2025-08-14 RX ADMIN — METHYLPREDNISOLONE ACETATE 40 MG: 40 INJECTION, SUSPENSION INTRA-ARTICULAR; INTRALESIONAL; INTRAMUSCULAR; INTRASYNOVIAL; SOFT TISSUE at 09:45

## 2025-08-14 RX ADMIN — LIDOCAINE HYDROCHLORIDE 10 ML: 10 INJECTION, SOLUTION EPIDURAL; INFILTRATION; INTRACAUDAL; PERINEURAL at 09:44

## 2025-08-14 RX ADMIN — LIDOCAINE HYDROCHLORIDE 10 ML: 10 INJECTION, SOLUTION EPIDURAL; INFILTRATION; INTRACAUDAL; PERINEURAL at 09:45

## 2025-08-14 ASSESSMENT — PAIN - FUNCTIONAL ASSESSMENT: PAIN_FUNCTIONAL_ASSESSMENT: 0-10

## 2025-08-14 ASSESSMENT — PAIN SCALES - GENERAL: PAINLEVEL_OUTOF10: 0 - NO PAIN

## 2025-08-15 ENCOUNTER — OFFICE VISIT (OUTPATIENT)
Dept: PRIMARY CARE | Facility: CLINIC | Age: 65
End: 2025-08-15
Payer: MEDICARE

## 2025-08-15 ENCOUNTER — CLINICAL SUPPORT (OUTPATIENT)
Dept: PRIMARY CARE | Facility: CLINIC | Age: 65
End: 2025-08-15
Payer: MEDICARE

## 2025-08-15 VITALS
DIASTOLIC BLOOD PRESSURE: 72 MMHG | SYSTOLIC BLOOD PRESSURE: 129 MMHG | BODY MASS INDEX: 27.73 KG/M2 | WEIGHT: 182.4 LBS | HEART RATE: 71 BPM | OXYGEN SATURATION: 95 % | TEMPERATURE: 97.9 F

## 2025-08-15 DIAGNOSIS — E11.65 TYPE 2 DIABETES MELLITUS WITH HYPERGLYCEMIA, WITHOUT LONG-TERM CURRENT USE OF INSULIN: Primary | ICD-10-CM

## 2025-08-15 DIAGNOSIS — R10.84 GENERALIZED ABDOMINAL PAIN: Primary | ICD-10-CM

## 2025-08-15 PROCEDURE — 1160F RVW MEDS BY RX/DR IN RCRD: CPT

## 2025-08-15 PROCEDURE — 3078F DIAST BP <80 MM HG: CPT

## 2025-08-15 PROCEDURE — 1159F MED LIST DOCD IN RCRD: CPT

## 2025-08-15 PROCEDURE — 1125F AMNT PAIN NOTED PAIN PRSNT: CPT

## 2025-08-15 PROCEDURE — 3074F SYST BP LT 130 MM HG: CPT

## 2025-08-15 PROCEDURE — 99214 OFFICE O/P EST MOD 30 MIN: CPT

## 2025-08-15 PROCEDURE — 3051F HG A1C>EQUAL 7.0%<8.0%: CPT

## 2025-08-15 ASSESSMENT — PAIN SCALES - GENERAL: PAINLEVEL_OUTOF10: 8

## 2025-08-16 LAB
ALBUMIN SERPL-MCNC: 4.9 G/DL (ref 3.6–5.1)
ALP SERPL-CCNC: 50 U/L (ref 35–144)
ALT SERPL-CCNC: 19 U/L (ref 9–46)
AMYLASE SERPL-CCNC: 39 U/L (ref 21–101)
ANION GAP SERPL CALCULATED.4IONS-SCNC: 10 MMOL/L (CALC) (ref 7–17)
AST SERPL-CCNC: 18 U/L (ref 10–35)
BASOPHILS # BLD AUTO: 40 CELLS/UL (ref 0–200)
BASOPHILS NFR BLD AUTO: 0.4 %
BILIRUB SERPL-MCNC: 1 MG/DL (ref 0.2–1.2)
BUN SERPL-MCNC: 20 MG/DL (ref 7–25)
CALCIUM SERPL-MCNC: 9.5 MG/DL (ref 8.6–10.3)
CHLORIDE SERPL-SCNC: 102 MMOL/L (ref 98–110)
CO2 SERPL-SCNC: 26 MMOL/L (ref 20–32)
CREAT SERPL-MCNC: 1.02 MG/DL (ref 0.7–1.35)
EGFRCR SERPLBLD CKD-EPI 2021: 81 ML/MIN/1.73M2
EOSINOPHIL # BLD AUTO: 50 CELLS/UL (ref 15–500)
EOSINOPHIL NFR BLD AUTO: 0.5 %
ERYTHROCYTE [DISTWIDTH] IN BLOOD BY AUTOMATED COUNT: 13 % (ref 11–15)
GLUCOSE SERPL-MCNC: 135 MG/DL (ref 65–99)
HCT VFR BLD AUTO: 42.8 % (ref 38.5–50)
HGB BLD-MCNC: 14.7 G/DL (ref 13.2–17.1)
LIPASE SERPL-CCNC: 39 U/L (ref 7–60)
LYMPHOCYTES # BLD AUTO: 1584 CELLS/UL (ref 850–3900)
LYMPHOCYTES NFR BLD AUTO: 16 %
MCH RBC QN AUTO: 34 PG (ref 27–33)
MCHC RBC AUTO-ENTMCNC: 34.3 G/DL (ref 32–36)
MCV RBC AUTO: 99.1 FL (ref 80–100)
MONOCYTES # BLD AUTO: 812 CELLS/UL (ref 200–950)
MONOCYTES NFR BLD AUTO: 8.2 %
NEUTROPHILS # BLD AUTO: 7415 CELLS/UL (ref 1500–7800)
NEUTROPHILS NFR BLD AUTO: 74.9 %
PLATELET # BLD AUTO: 230 THOUSAND/UL (ref 140–400)
PMV BLD REES-ECKER: 9.7 FL (ref 7.5–12.5)
POTASSIUM SERPL-SCNC: 4.2 MMOL/L (ref 3.5–5.3)
PROT SERPL-MCNC: 7.8 G/DL (ref 6.1–8.1)
RBC # BLD AUTO: 4.32 MILLION/UL (ref 4.2–5.8)
SODIUM SERPL-SCNC: 138 MMOL/L (ref 135–146)
WBC # BLD AUTO: 9.9 THOUSAND/UL (ref 3.8–10.8)

## 2025-08-16 ASSESSMENT — ENCOUNTER SYMPTOMS
CHILLS: 0
CHEST TIGHTNESS: 0
VOMITING: 0
DIARRHEA: 1
FATIGUE: 0
SHORTNESS OF BREATH: 0
NAUSEA: 0
BLOOD IN STOOL: 0
PALPITATIONS: 0
CONSTIPATION: 0
ABDOMINAL DISTENTION: 0
FEVER: 0
APPETITE CHANGE: 0
ABDOMINAL PAIN: 1
ACTIVITY CHANGE: 0

## 2025-08-18 ENCOUNTER — TELEPHONE (OUTPATIENT)
Dept: PRIMARY CARE | Facility: CLINIC | Age: 65
End: 2025-08-18
Payer: MEDICARE

## 2025-08-26 DIAGNOSIS — M54.10 RADICULITIS: ICD-10-CM

## 2025-08-27 RX ORDER — METHOCARBAMOL 500 MG/1
500 TABLET, FILM COATED ORAL 3 TIMES DAILY PRN
Qty: 90 TABLET | Refills: 0 | Status: SHIPPED | OUTPATIENT
Start: 2025-08-27

## 2025-08-28 ENCOUNTER — APPOINTMENT (OUTPATIENT)
Dept: RADIOLOGY | Facility: HOSPITAL | Age: 65
End: 2025-08-28
Payer: MEDICARE

## 2025-08-28 ENCOUNTER — HOSPITAL ENCOUNTER (OUTPATIENT)
Dept: RADIOLOGY | Facility: HOSPITAL | Age: 65
Discharge: HOME | End: 2025-08-28
Payer: MEDICARE

## 2025-08-28 DIAGNOSIS — R10.84 GENERALIZED ABDOMINAL PAIN: ICD-10-CM

## 2025-08-28 PROCEDURE — 2500000005 HC RX 250 GENERAL PHARMACY W/O HCPCS

## 2025-08-28 PROCEDURE — 74176 CT ABD & PELVIS W/O CONTRAST: CPT

## 2025-08-28 RX ADMIN — BARIUM SULFATE 450 ML: 20 SUSPENSION ORAL at 15:24

## 2025-09-08 ENCOUNTER — APPOINTMENT (OUTPATIENT)
Dept: PRIMARY CARE | Facility: CLINIC | Age: 65
End: 2025-09-08
Payer: MEDICARE

## 2025-10-27 ENCOUNTER — APPOINTMENT (OUTPATIENT)
Dept: PRIMARY CARE | Facility: CLINIC | Age: 65
End: 2025-10-27
Payer: MEDICARE

## (undated) DEVICE — Device

## (undated) DEVICE — ADHESIVE, SKIN, DERMABOND ADVANCED, 15CM, PEN-STYLE

## (undated) DEVICE — SUTURE, MONOCRYL, 4-0, 18 IN, PS2, UNDYED